# Patient Record
Sex: FEMALE | Race: WHITE | Employment: FULL TIME | URBAN - METROPOLITAN AREA
[De-identification: names, ages, dates, MRNs, and addresses within clinical notes are randomized per-mention and may not be internally consistent; named-entity substitution may affect disease eponyms.]

---

## 2022-02-11 NOTE — ED INITIAL ASSESSMENT (HPI)
Pt states it feels like something is \"stick in the back of my throat\" started 2/11/22 after lunch at 1030

## 2022-12-31 NOTE — DISCHARGE INSTRUCTIONS
Ice to the back. Gentle stretching exercises. Take the medications as prescribed. The muscle relaxant may make you drowsy, no driving while taking. Follow-up with your doctor or the physical medicine doctor referred to. Return for any concerns.

## 2023-05-01 NOTE — ED INITIAL ASSESSMENT (HPI)
Pt c/o cough, sore throat, runny nose, body aches, fatigue x3 days, fever since yesterday, loss of taste today. Tmax 101. Nyquil last dose at 1030am today.

## 2023-05-11 NOTE — PATIENT INSTRUCTIONS
#1 angioedema and urticaria  Chronic and intermittent in nature. Recent flare after COVID infection last month. Currently on Zyrtec 4 times per day with good control. By history response to antihistamines. No prior ED visits or prednisone. No prior EpiPen use. No current EpiPen. Prior allergy evaluation 1 year ago in [facility] for environmental allergies. Handouts on angioedema and urticaria provided and reviewed  Continue with Zyrtec 4 times per day. If refractory to Zyrtec may consider Xyzal in place of Zyrtec 5 mg once a day up to 4 times per day  EpiPen prescription placed. Check TSH  Check C1 esterase inhibitor level  Consider Xolair if refractory to above recommendations continue with Zyrtec 4 times per day until patient has been hive free and swelling free for at least 4 weeks. Then may try decreasing Zyrtec dose by 1 dose every week      #2 environmental allergies  Reviewed avoidance measures and potential treatment option of immunotherapy  Zyrtec as an antihistamine  May add Flonase or Nasacort 2 sprays per nostril once a day if having prominent nasal congestion or postnasal drip    #3 COVID vaccines up-to-date x3 doses.   Recommend booster and indicated    #4 recommend flu vaccine in the fall         Orders This Visit:  Orders Placed This Encounter      Complement C4 and C1 Esterase Serum plus C1 Esterase Inhibitor Function      TSH W Reflex To Free T4      Meds This Visit:  Requested Prescriptions     Signed Prescriptions Disp Refills    EPINEPHrine (EPIPEN 2-EROS) 0.3 MG/0.3ML Injection Solution Auto-injector 1 each 0     Sig: Inject IM in event of  allergic reaction

## 2023-05-16 NOTE — TELEPHONE ENCOUNTER
----- Message from Patrick Isidro MD sent at 5/11/2023  4:07 PM CDT -----  Please contact patient with normal TSH level 0.666

## 2023-05-16 NOTE — TELEPHONE ENCOUNTER
----- Message from Hans Frederick MD sent at 5/16/2023  2:39 PM CDT -----   Please contact patient with normal C1 esterase inhibitor panel

## 2023-05-30 NOTE — TELEPHONE ENCOUNTER
Left message for the third time   Sent [employer] message as well  Letter sent home to contact office in order to go over test results listed below

## 2023-06-05 NOTE — TELEPHONE ENCOUNTER
Patient calling in regards to test results  Pt confirmed name, and . Pt verbalizes understanding, and denies further questions.

## 2023-11-08 NOTE — PROGRESS NOTES
Avery Lopez is a 28year old male. HPI:     Chief Complaint   Patient presents with    Allergies     Having flare up     Patient is a 25-year-old male who presents for follow-up with a chief complaint of hives and swelling  Patient last seen by me in May 2023    Patient with history of chronic urticaria and angioedema. Intermittent flares over the years. Has EpiPen. Prior allergy evaluation 2 years ago in [facility]. Prior lab evaluation with me including C1 esterase inhibitor panel TSH were unremarkable. No prior testing to environmental allergens with me in the past    COVID-vaccine x3 doses per patient report    Medication list include EpiPen Zyrtec Xyzal    Today patient reports    Anna Johnson  July : lasted 2 weeks. Xyzal 2x/day  then back to 4x/day   Active or persistent symptoms  1x in aug: no trigger, lasted 1 week . 1x in October: covid like symptoms, test negative. Lasted 1 week    ED visits or prednisone in the interim:  denies   Has pred and epi at home   Active meds:  xyzal 4 pills per day   A/w with hives in July 2023  Lip and heal swelling as well in July 2023  Oct 2023: lip swelling   No oral swelling or resp issues     No prior xolair       HISTORY:  Past Medical History:   Diagnosis Date    Idiopathic angioedema       No past surgical history on file. No family history on file. Social History:   Social History     Socioeconomic History    Marital status:    Tobacco Use    Smoking status: Never     Passive exposure: Never    Smokeless tobacco: Never   Vaping Use    Vaping Use: Never used   Substance and Sexual Activity    Alcohol use: Yes     Comment: occ    Drug use: Never        Medications (Active prior to today's visit):  Current Outpatient Medications   Medication Sig Dispense Refill    levocetirizine 5 MG Oral Tab Take 1 tablet (5 mg total) by mouth every evening.       predniSONE 10 MG Oral Tab Take 3 tabs(30 mg) with food once a day x 5 days 15 tablet 0 Cholecalciferol (VITAMIN D3) 250 MCG (58202 UT) Oral Cap       EPINEPHrine (EPIPEN 2-EROS) 0.3 MG/0.3ML Injection Solution Auto-injector Inject IM in event of  allergic reaction 1 each 0    B Complex Vitamins (VITAMIN B COMPLEX) Oral Tab       omeprazole 20 MG Oral Capsule Delayed Release TAKE 1 CAPSULE IN THE MORNING 30 MINUTES BEFORE FOOD      cetirizine 10 MG Oral Tab          Allergies:  No Known Allergies      ROS:   Allergic/Immuno:  See hpi  Cardiovascular:  Negative for irregular heartbeat/palpitations, chest pain, edema  Constitutional:  Negative night sweats,weight loss, irritability and lethargy  ENMT:  Negative for ear drainage, hearing loss and nasal drainage  Eyes:  Negative for eye discharge and vision loss  Gastrointestinal:  Negative for abdominal pain, diarrhea and vomiting  Integumentary:  Negative for pruritus and rash  Respiratory:  Negative for cough, dyspnea and wheezing    PHYSICAL EXAM:   Constitutional: responsive, no acute distress noted  Head/Face: NC/Atraumatic  Eyes/Vision: conjunctiva and lids are normal extraocular motion is intact   Ears/Audiometry: tympanic membranes are normal bilaterally hearing is grossly intact  Nose/Mouth/Throat: nose and throat are clear mucous membranes are moist   Neck/Thyroid: neck is supple without adenopathy  Lymphatic: no abnormal cervical, supraclavicular or axillary adenopathy is noted  Respiratory: normal to inspection lungs are clear to auscultation bilaterally normal respiratory effort   Cardiovascular: regular rate and rhythm no murmurs, gallups, or rubs  Abdomen: soft non-tender non-distended  Skin/Hair: no unusual rashes present   Extremities: no edema, cyanosis, or clubbing     ASSESSMENT/PLAN:   Assessment   Encounter Diagnoses   Name Primary?     Urticaria Yes    Angioedema, subsequent encounter     COVID-19 vaccine series completed     Flu vaccine need     Environmental allergies      #1 chronic idiopathic urticaria angioedema  4 flares since July 2023. No ED visits or prednisone. No EpiPen needed. Flare in July August and September and October  Each flare lasted approximately 1 week in spite of Xyzal up to 4 times per day    Recommend trial of Xolair as patient has been refractory to Xyzal up to 4 times per day  Xolair application provided. Reviewed benefits and risk of the medication. Continue with Xyzal up to 4 times per day  If any further swelling prior to Xolair status known then will add prednisone 30 mg once a day with food x5 days  Prior TSH and C1 esterase inhibitor testing was negative    #2 environmental allergies  Xyzal as an antihistamine  Flonase or Nasacort 2 sprays per nostril once a day if having prominent nasal congestion or postnasal drip    #3 COVID vaccines reviewed. 3 doses per patient report. Query sent  Recommend booster this fall    #4 flu vaccine recommended this fall and offered  Flu shot guven            Orders This Visit:  Orders Placed This Encounter   Procedures    Fluzone Quadrivalent 6mo+ 0.5mL       Meds This Visit:  Requested Prescriptions     Signed Prescriptions Disp Refills    predniSONE 10 MG Oral Tab 15 tablet 0     Sig: Take 3 tabs(30 mg) with food once a day x 5 days       Imaging & Referrals:  INFLUENZA VACCINE, QUAD, PRESERVATIVE FREE, 0.5 ML     11/8/2023  Jess Manrique MD    If medication samples were provided today, they were provided solely for patient education and training related to self administration of these medications. Teaching, instruction and sample was provided to the patient by myself. Teaching included  a review of potential adverse side effects as well as potential efficacy. Patient's questions were answered in regards to medication administration and dosing and potential side effects.  Teaching was provided via the teach back method

## 2023-11-08 NOTE — PATIENT INSTRUCTIONS
#1 chronic idiopathic urticaria angioedema  4 flares since July 2023. No ED visits or prednisone. No EpiPen needed. Flare in July August and September and October  Each flare lasted approximately 1 week in spite of Xyzal up to 4 times per day    Recommend trial of Xolair as patient has been refractory to Xyzal up to 4 times per day  Xolair application provided. Reviewed benefits and risk of the medication. Continue with Xyzal up to 4 times per day  If any further swelling prior to Xolair status known then will add prednisone 30 mg once a day with food x5 days  Prior TSH and C1 esterase inhibitor testing was negative    #2 environmental allergies  Xyzal as an antihistamine  Flonase or Nasacort 2 sprays per nostril once a day if having prominent nasal congestion or postnasal drip    #3 COVID vaccines reviewed. 3 doses per patient report.   Query sent  Recommend booster this fall    #4 flu vaccine recommended this fall and offered     predniSONE 10 MG Oral Tab 15 tablet 0     Sig: Take 3 tabs(30 mg) with food once a day x 5 days

## 2023-11-08 NOTE — TELEPHONE ENCOUNTER
Pt in office for Consult. Xolair Enrollment form completed. Will complete office ordering portion and then send to [address] [place] [address].

## 2023-11-14 NOTE — TELEPHONE ENCOUNTER
RN completed clinical portion of Xolair Prescriber Service From    Dr. Aurelio Wright completed physician portion of [address] [place] [address] Prescriber Service Form [employer]. Completed Prescriber Service Form and Patient Consent Form faxed to [employer] at [phone]. Await Fax confirmation.

## 2023-11-21 NOTE — TELEPHONE ENCOUNTER
Xolair PA form received. PA form is asking if patient has been evaluated for other causes of urticaria including bradykinin-related angioedema and interleukin-1 associated urticarial syndromes. Please advise on labs.

## 2023-11-21 NOTE — TELEPHONE ENCOUNTER
[facility] contacted at [phone]. RN spoke with PA representative, Tg Cheema. Requested Xolair PA form be sent to Allergy Office via fax. Await form.

## 2023-11-21 NOTE — TELEPHONE ENCOUNTER
LMTCB on patient's voicemail. Message left on voicemail that nurse needs to speak with him about labs that must be completed in order for insurance to approve coverage of Xolair.

## 2023-11-21 NOTE — TELEPHONE ENCOUNTER
[employer] PA form received via fax.      Covered Specialty Pharmacy: [facility]    Complete PA through Pharmacy Benefit:    [facility]    Telephone [phone]

## 2023-11-27 NOTE — TELEPHONE ENCOUNTER
Message left on patient's voicemail and through [address] as below . . . Manju Mahoney,     Dr. Meseret Hilario has placed an order for two lab tests, Connective Tissue Disease (VIDAL) Screen and Sed Rate. Insurance is requiring that these labs be performed to rule out nonallergic cause for hives. You do not require an appointment to have these labs performed. You may present to any of the [facility] locations in order to have the labs completed. Please call the Allergy Office at [phone] with any questions or send questions via [email or web address]. Shantanu Flowers, RN    This [email or web address] message has not been read.

## 2023-12-04 NOTE — TELEPHONE ENCOUNTER
Pt has not yet completed VIDAL or Sed Rate for Xolair Enrollment per insurance request.    RN left another voicemail for pt requesting that they complete blood work. Fax from [facility] stating that specialty pharmacy is [facility] [phone]    Reports that: \"The pharmacy has closed the order due to no response from provider. At this time, [employer] will place this service request on hold and will discontinue follow-ups. If additional services are requested, please contact us\"      RN advised pt that we cannot move forward with enrollment process until he has completed lab work. Provided call back number if he has any additional questions.

## 2023-12-07 NOTE — TELEPHONE ENCOUNTER
Patient contacted via telephone. Patient given results and information as per Dr. Jc Dewey below. Patient informed that negative lab results will be sent into his insurance with PA form for Xolair. Patient informed once response from insurance is received, nurse will contact him with next steps in order to start Xolair therapy. Patient verbalized understanding of information. Nelli Bourgeois MD  [facility] Clinical Staff  Please contact patient with negative VIDAL screening. This is good news.   This is a screening test for autoimmune disease and thankfully was negative      Saurav Middleton MD  12/5/2023  5:07 PM CST        Please call patient with normal ESR  of 7

## 2023-12-11 NOTE — TELEPHONE ENCOUNTER
Xolair [facility] PA form completed. Dr. Josué Grossman reviewed and signed form. Completed form with VIDAL result, Sed Rate result and 11/8/2023 Allergy Physician Visit Note faxed to [facility] Department at [phone]. Await fax confirmation.

## 2023-12-12 NOTE — TELEPHONE ENCOUNTER
Patient contacted, informed that Prescription for Prednisone was sent to his pharmacy and that patient will need to see Dr. Chance Lee for a f/u appt for any further refills of Prednisone. Patient verbalized understanding of information.

## 2023-12-12 NOTE — TELEPHONE ENCOUNTER
Patient last seen in Allergy 11/8/2023 for . . .     Urticaria Yes    Angioedema, subsequent encounter      COVID-19 vaccine series completed      Flu vaccine need      Environmental allergies         Requested Prescriptions   Pending Prescriptions Disp Refills    predniSONE 10 MG Oral Tab 15 tablet 0     Sig: Take 3 tabs(30 mg) with food once a day x 5 days       There is no refill protocol information for this order        Patient contacted via telephone. Patient offers that 2 weeks prior he did experience Angioedema of face and did take the course of Prednisone that was prescribed 11/8/2023. Patient denies any current angioedema, difficulty breathing or swallowing. He continues to take Xyzal 5 mg up to 4 times a day as needed. He states that he would like a refill of Prednisone in the event he begins to experience angioedema again. Patient informed Dr. Galo Jamil will address 12/13, and nurse will call back with his advice. Patient verbalized understanding of information.

## 2023-12-12 NOTE — TELEPHONE ENCOUNTER
Xolair PA approval Letter received from [facility]. Xolair 150 mg/mL prefilled syringe. Effective Dates: 12/12/2023- 6/12/2024    PA#: [employer] Benefit Plan: [id number]        [facility] contacted at [phone]. RN spoke with pharmacy service repYael December. Rep confirms that Xolair 150 mg/mL prefilled syringe Rx for 300 mg every 28 days was received from [facility]. Dr. Yeison Corbett, [address] pended for phone in. See [employer] message sent to patient . . .    Patient contacted via telephone as well and informed that [employer] message was sent with detailed instructions. Reviewed instructions over the phone. Patient offers he has been contacted by [facility] and will call [facility] with copayment assistance information. Almas Bhardwaj,         I have good news. The prior authorization for Xolair was approved by your insurance. Your covered specialty pharmacy is [facility] (Telephone Number [phone]). A Xolair prescription has been sent to your specialty pharmacy. You should have been contacted by [employer] with Xolair copay assistance information. If you have not received any communication with [employer], please call [phone] and obtain copay assistance information for Xolair. Then, please call the specialty pharmacy and give the copay assistance information and sophia physician's office permission to schedule delivery of Xolair to physician's office. An RN will then schedule delivery of Xolair to physician's office. Once the delivery of Xolair is received, your first Xolair injection visit / follow-up appointment with Dr. Yeison Corbett can be scheduled. Please be aware there is a 2 hour observation period in office post first Xolair injection. Subsequent Xolair injections require a 30 min observation period.          Marainn Matthews., RN

## 2023-12-13 NOTE — TELEPHONE ENCOUNTER
12/12/2023's Xolair Rx went through for a one time dose. Fax received from [facility] reporting above. Correct Rx for Xolair pended.

## 2023-12-19 NOTE — TELEPHONE ENCOUNTER
Please call the patient to schedule a consult with Dr Graciela Marshall. DVT's. Referred by Dr Sharifa Hyde.

## 2023-12-20 NOTE — TELEPHONE ENCOUNTER
Crystal from [facility] calling to schedule delivery for Xolair    Xolair 150 mg/ml syringe x 2 scheduled delivery for 12/27/23    First scheduled injection for 12/21/23

## 2023-12-21 NOTE — TELEPHONE ENCOUNTER
Patient contacted via telephone and informed as below. Patient verbalized understanding of information. Spoke with Dr. Rigoberto Drake regarding the possibility of angioedema, urticaria contributing towards development of DVT patient was diagnosed with 11/2023. Dr. Rigoberto Drake offers that it is unlikely that angioedema or urticaria is the cause of the DVT.

## 2023-12-21 NOTE — TELEPHONE ENCOUNTER
May I please have an in-clinic order for Xolair 150 mg/mL prefilled syringe, 300 mg every 28 days for diagnosis L50.1? Patient diagnosed with DVT to right leg 11/2023. Patient asks if there can be a correlation between the angioedema and development of DVT.

## 2023-12-21 NOTE — TELEPHONE ENCOUNTER
Spoke with Dr. Blanca Cisneros regarding the possibility of angioedema, urticaria contributing towards development of DVT patient was diagnosed with 11/2023. Dr. Blanca Cisneros offers that it is unlikely that angioedema or urticaria is the cause of the DVT.

## 2024-01-16 NOTE — PROGRESS NOTES
Per standing order patient to continue Xolair injections every 28 days 300  mg.     Patient verified name, , and injection to be given.     At 0957 , Xolair 300 mg administered subcutaneously to bilateral upper arms . 150 mg administered to right upper arm. 150 mg administered to left upper arm      Xolair 150 mg/mL prefilled syringe x 2  Lot #: [id number]  Exp Date: OCT 2024      Patient tolerated injection with no adverse side effects noted at time of administration.         ?  Patient monitored x 30 min after administration.   At 1025, patient released from office without any sign/symptoms of local or systemic reaction.  ?  Patient scheduled next Biologic Injection Appointment for 24

## 2024-01-17 NOTE — TELEPHONE ENCOUNTER
Spoke with Robert at [facility] specialty pharmacy regarding delivery of Xolair. Robert states Xolair will be delivered 1/23/24.    Next appointment is scheduled for 2/13/24.

## 2024-01-17 NOTE — TELEPHONE ENCOUNTER
Fred/[facility] [phone] is calling to coordinate delivery for the patient's Xolair medication.

## 2024-01-17 NOTE — TELEPHONE ENCOUNTER
Patient calling to inform that he has been asked by the [facility] speciality pharmacy to have our office contact them to coordinate the delivery of:  omalizumab (Xolair)

## 2024-01-18 NOTE — ED PROVIDER NOTES
Chief Complaint   Patient presents with    Medication Question       HPI:     Milton Andrew is a 33 year old male who presents with a request to identify the cause of past episodes of diarrhea.  Patient states that he had in episode of frequent diarrhea from January 12 to January 16.  Patient states he was having liquid stools several times an hour initially that tapered off to several times a day that has completely resolved at this point.  Patient admits to having nausea at that time but no vomiting.  He thought he may have had a tactile fever at that time.  Patient denies any blood in his stool at that time.  Patient states that he did have mid abdominal cramp like pain during the 4 days of his diarrhea illness.  Patient states in October of last year he had a similar episode of diarrhea that also lasted 4 days and resolved spontaneously.  Patient is concerned that this may happen again and he is requesting information as to what could have caused it and how to prevent it from reoccurring.  At this time patient denies any abdominal pain, denies any loose stools, denies any urinary symptoms, denies any nausea vomiting or other concerns.  Patient does not have a primary care provider and states he has not been seen by a primary care provider for several years.  He does follow with an allergist for condition of angioedema.      PFSH    PFS asessment screens reviewed and agree.  Nurses notes reviewed I agree with documentation.    Family History   Family history unknown: Yes     Family history is reviewed and is as noted in HPI.  Social History     Socioeconomic History    Marital status:      Spouse name: Not on file    Number of children: Not on file    Years of education: Not on file    Highest education level: Not on file   Occupational History    Not on file   Tobacco Use    Smoking status: Never     Passive exposure: Never    Smokeless tobacco: Never   Vaping Use    Vaping Use: Never used    Substance and Sexual Activity    Alcohol use: Yes     Comment: occasional    Drug use: Never    Sexual activity: Yes   Other Topics Concern    Not on file   Social History Narrative    Will is  to Dilan x 2 yrs. He has no children. Patient works [employer]. He and his spouse live in [place], [place].     Social Determinants of Health     Financial Resource Strain: Not on file   Food Insecurity: Not on file   Transportation Needs: Not on file   Physical Activity: Not on file   Stress: Not on file   Social Connections: Not on file   Housing Stability: Not on file         ROS:   Positive for stated complaint: History of diarrhea  All other systems reviewed and negative except as noted above.  Constitutional and Vital Signs Reviewed.      Physical Exam:     Findings:    /75   Pulse 68   Temp 97.9 °F (36.6 °C) (Temporal)   Resp 16   SpO2 100%   GENERAL: well developed, well nourished, obese, well hydrated, no distress  SKIN: good skin turgor, no obvious rashes  NECK: supple, no adenopathy  CARDIO: RRR without murmur, Cap refill brisk  EXTREMITIES: URRUTIA without difficulty  GI: soft, non-tender to palpation, negative Dawson sign, bowel sounds are normal active, no pain with palpation of left or right lower quadrant.  HEAD: normocephalic, atraumatic, no facial asymmetry  EYES: bilateral sclera non icteric, no conjunctival injection or discharge, no periorbital swelling  THROAT: airway patent,   LUNGS: Full symmetric AE, clear to auscultation bilaterally; no rales, rhonchi, or wheezes, No signs of increased WOB  NEURO: No observed focal deficits, speech clear  PSYCH: Alert and oriented x3.  Answering questions appropriately.  Mood appropriate.    MDM/Assessment/Plan:   Orders for this encounter:    No orders of the defined types were placed in this encounter.      Labs performed this visit:  No results found for this or any previous visit (from the past 10 hour(s)).    MDM:  Differential diagnosis  includes history of diarrhea with possible etiology of viral origin, colitis, diverticulitis, food intolerance.  Discussed at length with patient and he agrees to establish care with a primary care provider for complete physical and screening tests.  Further evaluation if the diarrhea should occur would best be handled by his primary care provider.  Patient agrees with this plan of care.    Diagnosis:    ICD-10-CM    1. Well exam without abnormal findings of patient 18 years of age or older  Z00.00           All results reviewed and discussed with patient.  See AVS for detailed discharge instructions for your condition today.    Follow Up with:  Caden Hood MD  [address] [place] [id number]  [phone]    Schedule an appointment as soon as possible for a visit in 1 week

## 2024-01-18 NOTE — DISCHARGE INSTRUCTIONS
As we discussed there are many possible etiologies for the 2 episodes of diarrhea that you have had.  Since all of your symptoms have resolved at this time I recommend you start with establishing care with a primary care provider.  A referral has been given for a primary care provider.  In the event that you develop similar symptoms push oral fluids, avoid high-fiber foods, and seek medical evaluation if diarrhea lasts more than 5 days, if there is blood in the stool, if you develop any dizziness or lightheadedness, or other concerns.Thank you for choosing our [facility] for your medical condition today. Please be sure to follow up with your primary care provider as directed. If any concerns call your primary care provider, return here or go to the emergency department.

## 2024-01-18 NOTE — ED INITIAL ASSESSMENT (HPI)
Patient wants to make sure his medication is  not causing explosive diarrhea.  He had an episode of explosive diarrhea a few months back and a week ago.  He denies any symptoms at this time.

## 2024-01-23 NOTE — TELEPHONE ENCOUNTER
Called [facility] to coordinate delivery of Xolair.    Spoke to Gabbi    Next Injection Appointment Date: 2/13/2023      Confirmed dose and shipping address.      Gabbi reports that it is too soon to refill.  She reports that 2/6 is the earliest date to coordinate delivery.

## 2024-01-25 PROBLEM — E66.812 OBESITY, CLASS II, BMI 35-39.9: Status: ACTIVE | Noted: 2024-01-25

## 2024-01-25 PROBLEM — Z91.09 ENVIRONMENTAL ALLERGIES: Status: ACTIVE | Noted: 2024-01-25

## 2024-01-25 PROBLEM — Z79.899 ON PRE-EXPOSURE PROPHYLAXIS FOR HIV: Status: ACTIVE | Noted: 2024-01-25

## 2024-01-25 PROBLEM — T78.3XXA ANGIO-EDEMA: Status: ACTIVE | Noted: 2024-01-25

## 2024-01-25 PROBLEM — E66.9 OBESITY, CLASS II, BMI 35-39.9: Status: ACTIVE | Noted: 2024-01-25

## 2024-01-25 NOTE — PROGRESS NOTES
Subjective:   Milton Andrew is a 33 year old male who presents for Physical (Patient presents today for annual physical & functional dyspepsia.)     33-year-old male coming in for a routine physical.  Following up with hematology after superficial vein thrombosis.  Hematology considered treatment for 45 days however given that was already on higher dose of Xarelto decided to treat for 3 months.  Patient states he is at day 40 and would like to discontinue therapy.  Currently getting medications such as naltrexone, metformin, topiramate combination through [facility].  Is on PrEP through an online pharmacy  Follows up with allergist Dr. Salgado for chronic idiopathic urticaria angioedema   Attempting to eat a healthier diet and exercising regularly in an effort to lose weight.  Sexually active with 1 male partner () for the past year however interested in STD testing.      History/Other:    Chief Complaint Reviewed and Verified  Nursing Notes Reviewed and   Verified  Tobacco Reviewed  Allergies Reviewed  Medications Reviewed    Medical History Reviewed  Surgical History Reviewed  Family History   Reviewed  Social History Reviewed         Tobacco:  He has never smoked tobacco.    Current Outpatient Medications   Medication Sig Dispense Refill    buPROPion 100 MG Oral Tab Take 3 tablets (300 mg total) by mouth 2 (two) times daily.      metFORMIN HCl ER, OSM, 500 MG (OSM) Oral Tablet 24 Hr Take 1 tablet (500 mg total) by mouth daily with breakfast.      omeprazole 20 MG Oral Capsule Delayed Release Take 1 capsule (20 mg total) by mouth every morning.      NALTREXONE HCL OR       Topiramate (TOPIRAGEN OR)       Cyanocobalamin (B-12) 1000 MCG Oral Cap Take 1 tablet by mouth daily.      TRUVADA 200-300 MG Oral Tab Take 1 tablet by mouth daily.      levocetirizine (XYZAL ALLERGY 24HR) 5 MG Oral Tab Take 1 tablet (5 mg total) by mouth in the morning, at noon, in the evening, and at bedtime.      Omalizumab  (XOLAIR) 150 MG/ML Subcutaneous Solution Prefilled Syringe Inject 300 mg into the skin every 28 days. 2 mL 5    predniSONE 10 MG Oral Tab Take 3 tabs(30 mg) with food once a day x 5 days 15 tablet 0    EPINEPHrine (EPIPEN 2-EROS) 0.3 MG/0.3ML Injection Solution Auto-injector Inject IM in event of  allergic reaction 1 each 0         Review of Systems:  Review of Systems   Constitutional:  Negative for chills, diaphoresis and fever.   HENT:  Negative for congestion, ear discharge, ear pain, sinus pressure, sinus pain and sore throat.    Eyes:  Negative for pain and discharge.   Respiratory:  Negative for cough, chest tightness, shortness of breath and wheezing.    Cardiovascular:  Negative for chest pain and palpitations.   Gastrointestinal:  Negative for abdominal pain, diarrhea, nausea and vomiting.   Endocrine: Negative for cold intolerance and heat intolerance.   Genitourinary:  Negative for dysuria, flank pain, frequency and urgency.   Musculoskeletal:  Negative for joint swelling.   Skin:  Negative for rash.   Neurological:  Negative for dizziness, syncope and headaches.   Psychiatric/Behavioral:  Negative for confusion and hallucinations.        Objective:   /70 (BP Location: Right arm, Patient Position: Sitting, Cuff Size: large)   Pulse 80   Temp 96.8 °F (36 °C) (Temporal)   Ht 5' 10\" (1.778 m)   Wt 248 lb (112.5 kg)   SpO2 97%   BMI 35.58 kg/m²  Estimated body mass index is 35.58 kg/m² as calculated from the following:    Height as of this encounter: 5' 10\" (1.778 m).    Weight as of this encounter: 248 lb (112.5 kg).  Physical Exam  Constitutional:       General: He is not in acute distress.     Appearance: Normal appearance. He is obese. He is not ill-appearing or toxic-appearing.   HENT:      Head: Normocephalic and atraumatic.      Right Ear: Tympanic membrane and ear canal normal.      Left Ear: Tympanic membrane and ear canal normal.      Mouth/Throat:      Mouth: Mucous membranes are moist.       Pharynx: Oropharynx is clear. No oropharyngeal exudate or posterior oropharyngeal erythema.   Eyes:      Extraocular Movements: Extraocular movements intact.      Pupils: Pupils are equal, round, and reactive to light.   Cardiovascular:      Rate and Rhythm: Normal rate and regular rhythm.      Heart sounds: Normal heart sounds. No murmur heard.     No gallop.   Pulmonary:      Effort: Pulmonary effort is normal. No respiratory distress.      Breath sounds: Normal breath sounds. No stridor. No wheezing, rhonchi or rales.   Abdominal:      General: Bowel sounds are normal.      Palpations: Abdomen is soft.      Tenderness: There is no abdominal tenderness. There is no right CVA tenderness, left CVA tenderness or guarding.   Musculoskeletal:         General: No swelling.      Cervical back: Normal range of motion and neck supple. No rigidity or tenderness.      Right lower leg: No edema.      Left lower leg: No edema.   Skin:     General: Skin is warm and dry.   Neurological:      General: No focal deficit present.      Mental Status: He is alert and oriented to person, place, and time. Mental status is at baseline.      Cranial Nerves: No cranial nerve deficit.      Sensory: No sensory deficit.      Motor: Motor function is intact. No weakness.      Gait: Gait normal.   Psychiatric:         Mood and Affect: Mood normal.         Behavior: Behavior normal.         Thought Content: Thought content normal.         Judgment: Judgment normal.         Assessment & Plan:   1. Routine medical exam (Primary)  -Healthy diet and lifestyle.  -Weight loss.  -Exercise as tolerated.  -Dental exam every 6 months or as recommended by dentist.  -Eye exams annually, at least every 2 years or as recommended by specialist.  -     CBC, Platelet; No Differential; Future; Expected date: 01/25/2024  -     Comp Metabolic Panel (14); Future; Expected date: 01/25/2024  -     Hemoglobin A1C; Future; Expected date: 01/25/2024  -     Lipid  Panel; Future; Expected date: 01/25/2024  -     TSH W Reflex To Free T4; Future; Expected date: 01/25/2024  2. Superficial vein thrombosis  -Continue follow-up with hematology.  Advised not to discontinue Xarelto prior to discussion with Dr. Davidson.  3. Environmental allergies  On Xyzal and nasal spray.  Continue follow-up with Dr. Salgado (allergist).  4. Angioedema, subsequent encounter  Chronic idiopathic urticaria angioedema.  On Xyzal and Xolair.  -Continue follow-up with Dr. Salgado  5. Screen for STD (sexually transmitted disease)  Sexually active with 1 male partner () for the past year however interested in STD testing.  -     HIV Ag/Ab Combo; Future; Expected date: 01/25/2024  -     HCV Antibody; Future; Expected date: 01/25/2024  -     T Pallidum Screening Cascade; Future; Expected date: 01/25/2024  -     Chlamydia/Gc Amplification; Future; Expected date: 01/25/2024  6. On pre-exposure prophylaxis for HIV  On Truvada through online pharmacy.  -     HIV Ag/Ab Combo; Future; Expected date: 01/25/2024  7. Obesity, Class II, BMI 35-39.9  -Continue with healthy diet and lifestyle  -Continue with exercise        Return in about 6 months (around 7/25/2024).    Jayy Dial MD, 1/25/2024, 5:22 PM

## 2024-01-30 NOTE — PROGRESS NOTES
Hematology Progress Note    Patient Name: Milton Andrew   YOB: 1990   Medical Record Number: [id number]   CSN: [id number]   Consulting Physician: Bonilla Davidson MD  Referring Physician(s): Caryn Hood MD  Date of Visit: 1/30/2024     Chief Complaint:  Superficial Vein Thrombus    History of Present Illness:     Milton Andrew is a 33 year old male that was seen at the time of consultation in the Hematology suite for evaluation of the above condition.  Patient has PMH relevant for idiopathic angioedema.  He reports symptoms of right lower extremity ankle pain extending up to behind the knee for roughly 4 weeks prior to seeking evaluation via ultrasound on 12/15/23. His ultrasound imaging shows a SVT in the greater saphenous vein from the proximal to distal calf measuring 4.5 cm in length.  Patient has been initiated on anticoagulation by his referring PCP initially at Xarelto 10 mg daily and then increased to treatment dose as if he had a DVT with 15 mg of Xarelto twice daily for the last roughly 3 weeks with plans to go to 20 mg/daily soon. He is tolerating anticoagulation well with improvement in symptoms; denying any bleeding or bruising symptoms.  Denies any provoking causes for DVT such as immobilization, trauma, catheter use, and no identifiable risk factors for SVT.  Patient has no systemic signs of illness. His ROS is negative for systemic signs of illness or other concerns.    Interval History:  Patient returns for follow-up of SVT Xarelto 20 mg daily.  Denies any major bleeding or bruising symptoms. Will reports RLE pain symptoms have resolved.  Denies DVT recurrence or PE symptoms.  He is interested in pursuing martial arts and would like to be off anticoagulation.  No other concerns on ROS.    Past Medical History:  Past Medical History:   Diagnosis Date    Acid reflux     Idiopathic angioedema 05/2023    Superficial vein thrombosis 12/15/2023       Past Surgical  History:  Past Surgical History:   Procedure Laterality Date    WISDOM TEETH REMOVED      no associated bleeding complications       Family Medical History:  Family History   Family history unknown: Yes       Social History:  Social History     Socioeconomic History    Marital status:      Spouse name: Not on file    Number of children: Not on file    Years of education: Not on file    Highest education level: Not on file   Occupational History    Not on file   Tobacco Use    Smoking status: Never     Passive exposure: Never    Smokeless tobacco: Never   Vaping Use    Vaping Use: Never used   Substance and Sexual Activity    Alcohol use: Yes     Comment: occasional    Drug use: Never    Sexual activity: Yes   Other Topics Concern    Not on file   Social History Narrative    Julian is  to Dilan jolly 2 yrs. He has no children. Patient works [employer] care [employer]. He and his spouse live in [place], [place].     Social Determinants of Health     Financial Resource Strain: Not on file   Food Insecurity: Not on file   Transportation Needs: Not on file   Physical Activity: Not on file   Stress: Not on file   Social Connections: Not on file   Housing Stability: Not on file       Allergies:   No Known Allergies    Current Medications:   tadalafil 5 MG Oral Tab Take 1 tablet (5 mg total) by mouth daily as needed.      buPROPion 100 MG Oral Tab Take 3 tablets (300 mg total) by mouth 2 (two) times daily.      metFORMIN HCl ER, OSM, 500 MG (OSM) Oral Tablet 24 Hr Take 1 tablet (500 mg total) by mouth daily with breakfast.      omeprazole 20 MG Oral Capsule Delayed Release Take 1 capsule (20 mg total) by mouth every morning.      NALTREXONE HCL OR       Topiramate (TOPIRAGEN OR)       Cyanocobalamin (B-12) 1000 MCG Oral Cap Take 1 tablet by mouth daily.      TRUVADA 200-300 MG Oral Tab Take 1 tablet by mouth daily.      levocetirizine (XYZAL ALLERGY 24HR) 5 MG Oral Tab Take 1 tablet (5 mg total) by mouth in the morning, at  noon, in the evening, and at bedtime.      Omalizumab (XOLAIR) 150 MG/ML Subcutaneous Solution Prefilled Syringe Inject 300 mg into the skin every 28 days. 2 mL 5    predniSONE 10 MG Oral Tab Take 3 tabs(30 mg) with food once a day x 5 days 15 tablet 0    EPINEPHrine (EPIPEN 2-EROS) 0.3 MG/0.3ML Injection Solution Auto-injector Inject IM in event of  allergic reaction 1 each 0      omalizumab (Xolair) injection 300 mg 2.4 mL  300 mg Subcutaneous Q28 Days Clive Salgado MD   300 mg at 01/16/24 0957       Review of Systems:    Constitutional: Negative for anorexia, chills, fatigue, fevers, night sweats and weight loss.  Eyes: Negative for visual disturbance, irritation and redness.  Respiratory: Negative for cough, hemoptysis, chest pain, or dyspnea on exertion.  Gastrointestinal: Negative for dysphagia, odynophagia, reflux symptoms, nausea, vomiting, change in bowel habits, diarrhea, constipation and abdominal pain.  Integument/breast: Negative for rash, skin lesions, and pruritus.  Hematologic/lymphatic: Negative for easy bruising, bleeding, and lymphadenopathy.  Musculoskeletal: Negative for myalgias, arthralgias, muscle weakness.  Neurological: Negative for headaches, dizziness, speech problems, gait problems and weakness.    A comprehensive 14 point review of systems was completed.  Pertinent positives and negatives noted in the the HPI.     Vital Signs:  /79 (BP Location: Left arm, Patient Position: Sitting, Cuff Size: large)   Pulse 79   Temp 97.7 °F (36.5 °C) (Oral)   Resp 16   Ht 1.778 m (5' 10\")   Wt 110.7 kg (244 lb)   SpO2 99%   BMI 35.01 kg/m²     Physical Examination:    General: Patient is alert and oriented x 3, not in acute distress.  Psych:  Friendly, cooperative with appropriate questions and responses  HEENT: EOMs intact. Oropharynx is clear.   Neck:  No palpable lymphadenopathy. Neck is supple.  Lymphatics: There is no palpable lymphadenopathy throughout in the cervical,  supraclavicular, axillary, or inguinal regions.  Chest: Clear to auscultation. No wheezes or rales.  Heart: Regular rate and rhythm. S1S2 normal.  Abdomen: Soft, non tender with good bowel sounds.  No hepatosplenomegaly.  No palpable mass.  Extremities: No edema or calf tenderness.  Neurological: Grossly intact.      Labs:    Lab Results   Component Value Date/Time    WBC 5.1 01/02/2024 04:03 PM    RBC 5.33 01/02/2024 04:03 PM    HGB 14.8 01/02/2024 04:03 PM    HCT 44.4 01/02/2024 04:03 PM    MCV 83.3 01/02/2024 04:03 PM    MCH 27.8 01/02/2024 04:03 PM    MCHC 33.3 01/02/2024 04:03 PM    RDW 13.3 01/02/2024 04:03 PM    NEPRELIM 3.01 01/02/2024 04:03 PM    .0 01/02/2024 04:03 PM       Lab Results   Component Value Date/Time     (H) 01/02/2024 04:03 PM    BUN 16 01/02/2024 04:03 PM    CREATSERUM 1.40 (H) 01/02/2024 04:03 PM    CA 9.5 01/02/2024 04:03 PM    ALB 4.5 01/02/2024 04:03 PM     01/02/2024 04:03 PM    K 4.0 01/02/2024 04:03 PM     01/02/2024 04:03 PM    CO2 24.0 01/02/2024 04:03 PM    ALKPHO 51 01/02/2024 04:03 PM    AST 27 01/02/2024 04:03 PM    ALT 23 01/02/2024 04:03 PM         Impression:    No diagnosis found.    33 year old M with PMH relevant for idiopathic angioedema presents for an evaluation of a RLE SVT identified on 12/15/23    Plan:    1.) SVT--no risk factors for the development of this clot    --discussed with pt that he would qualify for intermediate risk for SVT and initially would have rec anticoagulation with Xarelto 10 mg x 45 days; however, his referring PCP is already treating him as a DVT with full dose anticoagulation  --tolerating tx well with resolution of right lower extremity swelling    --Initially planned treatment will be for 3 mo; Xarelto 20 mg/daily with food; however, the pt would prefer the 45 days of anticoagulation as he is planning to start martial arts and would like to be off anticoagulation to prevent bleeding complications    --discussed  plans to look for hereditary and acquired thrombophilia as potential causes of his SVT: results show he is negative for factor V Leiden mutation, prothrombin gene mutation, Antithrombin III deficiency, protein C and protein S deficiency  --likely has a false positive for antiphospholipid antibody presence in the setting of Xarelto, so will require repeat testing in 3 months at the time of his f/u appt    --we reviewed [facility] in clinic which shows no difference in outcomes or prevention of recurrence with tx for 45 days vs. 90 days of anticoagulation  --he will go for repeat ultrasound now and if clot has resolved, then he will stop Xarelto   --Will is planning to RTC in 3 mo for repeat antiphospholipid ab testing     2.) Current Use of Anticoagulation    --advised pt to be mindful of use of aspirin and ibuprofen while on Xarelto to decrease his bleeding risks  --no contraindications as patient has never had intracranial bleeding, GI bleeding or falls  --normal CBC+diff at baseline, has normal LFT's and mild in Cr on 1/2/24 but appropriate for Xarelto    MDM: Moderate Risk    Bonilla Davidson MD  [facility]  [facility]  [address] [place] [address], [place] [address]

## 2024-02-01 ENCOUNTER — APPOINTMENT (OUTPATIENT)
Dept: HEMATOLOGY/ONCOLOGY | Facility: HOSPITAL | Age: 34
End: 2024-02-01
Attending: INTERNAL MEDICINE

## 2024-02-06 NOTE — PROGRESS NOTES
Milton Andrew is a 33 year old male.    HPI:   No chief complaint on file.    Patient is a 33-year-old male who presents for follow-up via video visit    This visit is conducted using Telemedicine with live, interactive video and audio during this Coronavirus pandemic.     Please note that the following visit was completed using two-way, real-time interactive audio and/or video communication.  This has been done in good ambrose to provide continuity of care in the best interest of the provider-patient relationship, due to the ongoing public health crisis/national emergency and because of restrictions of visitation.  There are limitations of this visit as no physical exam could be performed.  Every conscious effort was taken to allow for sufficient and adequate time.  This billing was spent on reviewing labs, medications, radiology tests and decision making.  Appropriate medical decision-making and tests are ordered as detailed in the plan of care below     Patient last seen by me in November 2023 for chronic spontaneous urticaria and angioedema  Prior C1 esterase inhibitor testing was normal normal TSH  COVID-vaccine x 3 doses  Flu vaccine up-to-date    Medication list include Xolair 300 mg every 4 weeks Xyzal EpiPen and proton pump number    Today patient reports    Chronic spontaneous urticaria/idiopathic  Active or persistent symptoms: denies   Xolair 2-3 doses so far   ED visits or prednisone in the interim denies  Active meds: Xolair 300 mg every 4 weeks, Xyzal once a day up to 4 times per day if needed  Better since starting xolair,   Xyzal 4x/day   Last flare was Nov 2023, none since starting xolair     Xolair in office     Has epipen utd     Denies adverse events or side effects with Xolair today    HISTORY:  Past Medical History:   Diagnosis Date    Acid reflux     Idiopathic angioedema 05/2023    Superficial vein thrombosis 12/15/2023      Past Surgical History:   Procedure Laterality Date     WISDOM TEETH REMOVED      no associated bleeding complications      Family History   Family history unknown: Yes      Social History:   Social History     Socioeconomic History    Marital status:    Tobacco Use    Smoking status: Never     Passive exposure: Never    Smokeless tobacco: Never   Vaping Use    Vaping Use: Never used   Substance and Sexual Activity    Alcohol use: Yes     Comment: occasional    Drug use: Never    Sexual activity: Yes   Social History Narrative    Julian is  to Dilan x 2 yrs. He has no children. Patient works health care analytics. He and his spouse live in [place], [place].        Medications (Active prior to today's visit):  Current Outpatient Medications   Medication Sig Dispense Refill    tadalafil 5 MG Oral Tab Take 1 tablet (5 mg total) by mouth daily as needed.      buPROPion 100 MG Oral Tab Take 3 tablets (300 mg total) by mouth 2 (two) times daily.      metFORMIN HCl ER, OSM, 500 MG (OSM) Oral Tablet 24 Hr Take 1 tablet (500 mg total) by mouth daily with breakfast.      omeprazole 20 MG Oral Capsule Delayed Release Take 1 capsule (20 mg total) by mouth every morning.      NALTREXONE HCL OR       Topiramate (TOPIRAGEN OR)       Cyanocobalamin (B-12) 1000 MCG Oral Cap Take 1 tablet by mouth daily.      TRUVADA 200-300 MG Oral Tab Take 1 tablet by mouth daily.      levocetirizine (XYZAL ALLERGY 24HR) 5 MG Oral Tab Take 1 tablet (5 mg total) by mouth in the morning, at noon, in the evening, and at bedtime.      Omalizumab (XOLAIR) 150 MG/ML Subcutaneous Solution Prefilled Syringe Inject 300 mg into the skin every 28 days. 2 mL 5    predniSONE 10 MG Oral Tab Take 3 tabs(30 mg) with food once a day x 5 days 15 tablet 0    EPINEPHrine (EPIPEN 2-EROS) 0.3 MG/0.3ML Injection Solution Auto-injector Inject IM in event of  allergic reaction 1 each 0       Allergies:  No Known Allergies      ROS:   Allergic/Immuno:  See hpi  Cardiovascular:  Negative for irregular  heartbeat/palpitations, chest pain, edema  Constitutional:  Negative night sweats,weight loss, irritability and lethargy  ENMT:  Negative for ear drainage, hearing loss and nasal drainage  Eyes:  Negative for eye discharge and vision loss  Gastrointestinal:  Negative for abdominal pain, diarrhea and vomiting  Integumentary:  Negative for pruritus and rash  Respiratory:  Negative for cough, dyspnea and wheezing    PHYSICAL EXAM:   Constitutional: responsive, no acute distress noted  Head/Face: NC/Atraumatic  Speaking in full sentences no increased work of breathing  A&O x 3     ASSESSMENT/PLAN:   Assessment   Encounter Diagnoses   Name Primary?    Chronic idiopathic urticaria Yes    Angioedema, subsequent encounter     Visit for monitoring Xolair therapy     Flu vaccine need     COVID-19 vaccine series completed        #1 chronic idiopathic urticaria and angioedema  No hives or swelling since starting Xolair  Patient has 2-3 doses so far without side effects.  Currently using Xyzal 4 times per day  May try decreasing Xyzal by 1 dose every week as long as clinically tolerating.  Would still remain on Xyzal 5 mg at least once a day  Patient can be posted of any issues or flares when trying to titrate down the Xyzal dosing.  Continue with Xolair 300 mg every 4 weeks.  Reviewed potential home administration in the future once patient is comfortable and has had a minimum of 3 doses in office.  Patient does have an EpiPen in the home setting.    #2 visit for monitoring Xolair therapy.  Denies side effects to date  Overall improving with Xolair    #3 flu vaccine recommended and offered  Flu vaccine up-to-date from November 2023      #4 COVID-vaccine recommend booster.  New booster available since September 2023.  Reviewed I do not have the vaccine in my office    Follow-up with me in 6 months or sooner if needed     Orders This Visit:  No orders of the defined types were placed in this encounter.      Meds This  Visit:  Requested Prescriptions      No prescriptions requested or ordered in this encounter       Imaging & Referrals:  None     2/6/2024  Clive Salgado MD    If medication samples were provided today, they were provided solely for patient education and training related to self administration of these medications.  Teaching, instruction and sample was provided to the patient by myself.  Teaching included  a review of potential adverse side effects as well as potential efficacy.  Patient's questions were answered in regards to medication administration and dosing and potential side effects. Teaching was provided via the teach back method

## 2024-02-06 NOTE — PATIENT INSTRUCTIONS
#1 chronic idiopathic urticaria and angioedema  No hives or swelling since starting Xolair  Patient has 2-3 doses so far without side effects.  Currently using Xyzal 4 times per day  May try decreasing Xyzal by 1 dose every week as long as clinically tolerating.  Would still remain on Xyzal 5 mg at least once a day  Patient can be posted of any issues or flares when trying to titrate down the Xyzal dosing.  Continue with Xolair 300 mg every 4 weeks.  Reviewed potential home administration in the future once patient is comfortable and has had a minimum of 3 doses in office.  Patient does have an EpiPen in the home setting.    #2 visit for monitoring Xolair therapy.  Denies side effects to date  Overall improving with Xolair    #3 flu vaccine recommended and offered  Flu vaccine up-to-date from November 2023      #4 COVID-vaccine recommend booster.  New booster available since September 2023.  Reviewed I do not have the vaccine in my office

## 2024-02-13 NOTE — H&P
Per standing order patient to continue Xolair injections every 28 days- 300 mg.     Patient verified name, , and injection to be given.         Patient confirms that he/she does have an Epi-Pen to use in case of an anaphylactic reaction due to Xolair.       Printed Official from [facility], Xolair Administration Instructions reviewed with patient.       Patient watched form [facility], Xolair Administration Video.       Patient  demonstrated how to give Xolair injection with Demo .      Patient demonstrated Subcutaneous Xolair Injection well.      Patient repeated back instructions for Xolair administration.      - Keep Xolair refrigerated, remove from refrigerator 60 min prior to administration. Leave at room temp until ready to inject.     -Inspect Xolair syringe (right med, right dose), contents of Xolair clear with no particles. Make sure Xolair syringe not .      - Wash hands, cleanse injection site with alcohol wipe.     -Remove cap, inject Xolair to upper thighs, abdomen 2 inches away from umbilicus at 45 to 90 degree angle with bevel up.      - Rotate sites monthly     -Do not rub injection site, place band-aid on if needed.     -Place syringe in sharp container.     Patient agreed to administer Epi-Pen if hives, shortness of breath, tightness in chest, edema of face/airway occur and to present to Emergency Department immediately after administration of Epi-Pen.     Patient agreed to report any adverse reaction or allergic reaction symptoms to Allergy RNs or Dr. Salgado if they should occur.     Patient verbalized comfort in administrating Epi-Pen if needed.                Patient verbalizes that they feel comfortable injecting Xolair at home.         Xolair 150 mg/mL prefilled syringe x2  NDC #: 69893-603-82  Lot #s: [id number]  Exp Date: 2024    RN injected Xolair 150 mg/mL prefilled syringe subcutaneously to Left Upper Outer Leg. Patient self-injected Xolair 150 mg/mL prefilled syringe  subcutaneously to Right Upper Outer Leg     Patient tolerated injection with no adverse side effects noted at time of administration.         ?  Patient monitored x 30 min after administration.   At 1102, patient released from office without any sign/symptoms of local or systemic reaction.    Pre PF: (3 readings) for asthma diagnosis  Post PF: (3 readings)  ?  . .

## 2024-02-13 NOTE — TELEPHONE ENCOUNTER
Patient presenting today for 3rd Xolair injection appointment.     Please advise if patient may be given Xolair injection education for at home use.

## 2024-02-13 NOTE — TELEPHONE ENCOUNTER
[facility] contacted at [phone].  RN spoke with pharmacy repCathi.       Attestation given for patient to order medication for delivery to home.

## 2024-02-14 NOTE — TELEPHONE ENCOUNTER
Medication delivered to our office. Patient was trained yesterday to inject at home.   Attestation was given to Specialty pharmacy yesterday to allow them to ship medication to his home.    Xolair 150 mg PFS x 2 received and placed in refrigerator.     RN left message for patient to call back. We did not coordinate delivery of medication to our office. Unsure if he was expecting his medication to be delivered to his home address today?  Will await patient call back.

## 2024-02-15 NOTE — TELEPHONE ENCOUNTER
Patient contacted.  He was informed that nurse attempted to contact him on 2/14/2024 to be sure he understands that he will need to schedule Xolair to his home, that Allergy office will not be scheduling delivery.     Patient verbalized understanding of information.

## 2024-03-25 ENCOUNTER — LAB ENCOUNTER (OUTPATIENT)
Dept: LAB | Facility: HOSPITAL | Age: 34
End: 2024-03-25
Attending: INTERNAL MEDICINE
Payer: COMMERCIAL

## 2024-03-25 DIAGNOSIS — I82.890 SUPERFICIAL VEIN THROMBOSIS: ICD-10-CM

## 2024-03-25 DIAGNOSIS — Z79.01 CURRENT USE OF ANTICOAGULANT THERAPY: ICD-10-CM

## 2024-03-25 DIAGNOSIS — E05.90 SUBCLINICAL HYPERTHYROIDISM: ICD-10-CM

## 2024-03-25 LAB
T4 FREE SERPL-MCNC: 1 NG/DL (ref 0.8–1.7)
TSI SER-ACNC: 0.63 MIU/ML (ref 0.55–4.78)

## 2024-03-25 PROCEDURE — 85613 RUSSELL VIPER VENOM DILUTED: CPT

## 2024-03-25 PROCEDURE — 84443 ASSAY THYROID STIM HORMONE: CPT

## 2024-03-25 PROCEDURE — 85598 HEXAGNAL PHOSPH PLTLT NEUTRL: CPT

## 2024-03-25 PROCEDURE — 86147 CARDIOLIPIN ANTIBODY EA IG: CPT

## 2024-03-25 PROCEDURE — 85610 PROTHROMBIN TIME: CPT

## 2024-03-25 PROCEDURE — 36415 COLL VENOUS BLD VENIPUNCTURE: CPT

## 2024-03-25 PROCEDURE — 84439 ASSAY OF FREE THYROXINE: CPT

## 2024-03-25 PROCEDURE — 85730 THROMBOPLASTIN TIME PARTIAL: CPT

## 2024-03-25 PROCEDURE — 85390 FIBRINOLYSINS SCREEN I&R: CPT

## 2024-03-25 PROCEDURE — 86146 BETA-2 GLYCOPROTEIN ANTIBODY: CPT

## 2024-03-26 LAB
APTT PPP: 26.3 SECONDS (ref 23.3–35.6)
B2 GLYCOPROT1 IGG SERPL IA-ACNC: 1.2 U/ML (ref ?–7)
B2 GLYCOPROT1 IGM SERPL IA-ACNC: <2.4 U/ML (ref ?–7)
CARDIOLIPIN IGG SERPL-ACNC: 4.6 GPL (ref ?–10)
CARDIOLIPIN IGM SERPL-ACNC: 2.1 MPL (ref ?–10)
CONFIRM APTT STACLOT: NEGATIVE
CONFIRM DRVVT: 1 S (ref 0–1.1)
PROTHROMBIN TIME: 14.7 SECONDS (ref 11.6–14.8)

## 2024-04-02 ENCOUNTER — OFFICE VISIT (OUTPATIENT)
Dept: HEMATOLOGY/ONCOLOGY | Facility: HOSPITAL | Age: 34
End: 2024-04-02
Attending: INTERNAL MEDICINE
Payer: COMMERCIAL

## 2024-04-02 VITALS
WEIGHT: 233 LBS | DIASTOLIC BLOOD PRESSURE: 69 MMHG | HEIGHT: 70 IN | SYSTOLIC BLOOD PRESSURE: 128 MMHG | TEMPERATURE: 98 F | RESPIRATION RATE: 16 BRPM | OXYGEN SATURATION: 100 % | BODY MASS INDEX: 33.36 KG/M2 | HEART RATE: 75 BPM

## 2024-04-02 DIAGNOSIS — I82.890 SUPERFICIAL VEIN THROMBOSIS: Primary | ICD-10-CM

## 2024-04-02 PROCEDURE — 99213 OFFICE O/P EST LOW 20 MIN: CPT | Performed by: INTERNAL MEDICINE

## 2024-04-02 RX ORDER — ESTRADIOL VALERATE 40 MG/ML
40 INJECTION INTRAMUSCULAR WEEKLY
COMMUNITY
Start: 2024-03-03

## 2024-04-02 RX ORDER — SPIRONOLACTONE 50 MG/1
50 TABLET, FILM COATED ORAL DAILY
COMMUNITY
Start: 2024-03-25

## 2024-04-02 NOTE — PROGRESS NOTES
Hematology Progress Note    Patient Name: Milton Andrew   YOB: 1990   Medical Record Number: [id number]   CSN: [id number]   Consulting Physician: Bonilla Davidson MD  Referring Physician(s): Caryn Hood MD  Date of Visit: 4/2/2024     Chief Complaint:  Superficial Vein Thrombus    History of Present Illness:     Milton Andrew is a 33 year old male that was seen at the time of consultation in the Hematology suite for evaluation of the above condition.  Patient has PMH relevant for idiopathic angioedema.  He reports symptoms of right lower extremity ankle pain extending up to behind the knee for roughly 4 weeks prior to seeking evaluation via ultrasound on 12/15/23. His ultrasound imaging shows a SVT in the greater saphenous vein from the proximal to distal calf measuring 4.5 cm in length.  Patient has been initiated on anticoagulation by his referring PCP initially at Xarelto 10 mg daily and then increased to treatment dose as if he had a DVT with 15 mg of Xarelto twice daily for the last roughly 3 weeks with plans to go to 20 mg/daily soon. He is tolerating anticoagulation well with improvement in symptoms; denying any bleeding or bruising symptoms.  Denies any provoking causes for DVT such as immobilization, trauma, catheter use, and no identifiable risk factors for SVT.  Patient has no systemic signs of illness. His ROS is negative for systemic signs of illness or other concerns.    Interval History:  Patient returns for follow-up of SVT s/p completion of 3 mo of planned Xarelto 20 mg daily.  Denies any major bleeding or bruising symptoms. Phonenix reports RLE pain symptoms have resolved.  Denies DVT recurrence or PE symptoms. They have no systemic signs of illness. Phoenix is currently being helped with chronic hives by allergy immunology. Phoenix is now on estrogen and spironolactone in an effort to transition into becoming a woman. No other concerns on ROS.    Past Medical History:  Past  Medical History:   Diagnosis Date    Acid reflux     Idiopathic angioedema 05/2023    Superficial vein thrombosis 12/15/2023       Past Surgical History:  Past Surgical History:   Procedure Laterality Date    WISDOM TEETH REMOVED      no associated bleeding complications       Family Medical History:  Family History   Family history unknown: Yes       Social History:  Social History     Socioeconomic History    Marital status:      Spouse name: Not on file    Number of children: Not on file    Years of education: Not on file    Highest education level: Not on file   Occupational History    Not on file   Tobacco Use    Smoking status: Never     Passive exposure: Never    Smokeless tobacco: Never   Vaping Use    Vaping Use: Never used   Substance and Sexual Activity    Alcohol use: Yes     Comment: occasional    Drug use: Never    Sexual activity: Yes   Other Topics Concern    Not on file   Social History Narrative    Will is  to Dilan jolly 2 yrs. He has no children. Patient works health care analytics. 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     Social Determinants of Health     Financial Resource Strain: Not on file   Food Insecurity: Not on file   Transportation Needs: Not on file   Physical Activity: Not on file   Stress: Not on file   Social Connections: Not on file   Housing Stability: Not on file       Allergies:   No Known Allergies    Current Medications:   Estradiol Valerate 40 MG/ML Intramuscular Oil Inject 1 mL (40 mg total) into the muscle once a week.      spironolactone 50 MG Oral Tab Take 1 tablet (50 mg total) by mouth daily.      tadalafil 5 MG Oral Tab Take 1 tablet (5 mg total) by mouth daily as needed.      buPROPion 100 MG Oral Tab Take 3 tablets (300 mg total) by mouth 2 (two) times daily.      metFORMIN HCl ER, OSM, 500 MG (OSM) Oral Tablet 24 Hr Take 1 tablet (500 mg total) by mouth daily with breakfast.      omeprazole 20 MG Oral Capsule Delayed Release Take 1 capsule (20 mg  total) by mouth every morning.      NALTREXONE HCL OR       Topiramate (TOPIRAGEN OR)       Cyanocobalamin (B-12) 1000 MCG Oral Cap Take 1 tablet by mouth daily.      TRUVADA 200-300 MG Oral Tab Take 1 tablet by mouth daily.      Omalizumab (XOLAIR) 150 MG/ML Subcutaneous Solution Prefilled Syringe Inject 300 mg into the skin every 28 days. 2 mL 5      omalizumab (Xolair) injection 300 mg 2.4 mL  300 mg Subcutaneous Q28 Days Clive Salgado MD   300 mg at 02/13/24 1023       Review of Systems:    Constitutional: Negative for anorexia, chills, fatigue, fevers, night sweats and weight loss.  Eyes: Negative for visual disturbance, irritation and redness.  Respiratory: Negative for cough, hemoptysis, chest pain, or dyspnea on exertion.  Gastrointestinal: Negative for dysphagia, odynophagia, reflux symptoms, nausea, vomiting, change in bowel habits, diarrhea, constipation and abdominal pain.  Integument/breast: Negative for rash, skin lesions, and pruritus.  Hematologic/lymphatic: Negative for easy bruising, bleeding, and lymphadenopathy.  Musculoskeletal: Negative for myalgias, arthralgias, muscle weakness.  Neurological: Negative for headaches, dizziness, speech problems, gait problems and weakness.    A comprehensive 14 point review of systems was completed.  Pertinent positives and negatives noted in the the HPI.     Vital Signs:  /69 (BP Location: Left arm, Patient Position: Sitting, Cuff Size: large)   Pulse 75   Temp 98.3 °F (36.8 °C) (Oral)   Resp 16   Ht 1.778 m (5' 10\")   Wt 105.7 kg (233 lb)   SpO2 100%   BMI 33.43 kg/m²     Physical Examination:    General: Patient is alert and oriented x 3, not in acute distress.  Psych:  Friendly, cooperative with appropriate questions and responses  HEENT: EOMs intact. Oropharynx is clear.   Neck:  No palpable lymphadenopathy. Neck is supple.  Lymphatics: There is no palpable lymphadenopathy throughout in the cervical, supraclavicular, axillary, or inguinal  regions.  Chest: Clear to auscultation. No wheezes or rales.  Heart: Regular rate and rhythm. S1S2 normal.  Abdomen: Soft, non tender with good bowel sounds.  No hepatosplenomegaly.  No palpable mass.  Extremities: No edema or calf tenderness.  Neurological: Grossly intact.      Labs:    Lab Results   Component Value Date/Time    WBC 5.2 01/30/2024 02:27 PM    RBC 4.95 01/30/2024 02:27 PM    HGB 14.1 01/30/2024 02:27 PM    HCT 41.4 01/30/2024 02:27 PM    MCV 83.6 01/30/2024 02:27 PM    MCH 28.5 01/30/2024 02:27 PM    MCHC 34.1 01/30/2024 02:27 PM    RDW 13.5 01/30/2024 02:27 PM    NEPRELIM 3.01 01/02/2024 04:03 PM    .0 01/30/2024 02:27 PM       Lab Results   Component Value Date/Time    GLU 78 01/30/2024 02:27 PM    BUN 25 (H) 01/30/2024 02:27 PM    CREATSERUM 1.38 (H) 01/30/2024 02:27 PM    CA 9.1 01/30/2024 02:27 PM    ALB 4.3 01/30/2024 02:27 PM     01/30/2024 02:27 PM    K 3.7 01/30/2024 02:27 PM     01/30/2024 02:27 PM    CO2 24.0 01/30/2024 02:27 PM    ALKPHO 55 01/30/2024 02:27 PM    AST 27 01/30/2024 02:27 PM    ALT 18 01/30/2024 02:27 PM         Impression:    No diagnosis found.    33 year old M with PMH relevant for idiopathic angioedema presents for an evaluation of a RLE SVT identified on 12/15/23    Plan:    1.) SVT--no risk factors for the development of this clot    --discussed with pt that he would qualify for intermediate risk for SVT and initially would have rec anticoagulation with Xarelto 10 mg x 45 days; however, his referring PCP is already treating him as a DVT with full dose anticoagulation of Xarelto 20 mg/daily at the time of consultation  --tolerated tx well with resolution of right lower extremity swelling    --Initially planned treatment will be for 3 mo; Xarelto 20 mg/daily with food; however, the pt would wanted to do only 45 days of anticoagulation as he was planning to start martial arts and would like to be off anticoagulation to prevent bleeding  complications    --Phoenix then underwent a repeat LE ultrasound in 1/2024 showing thrombosis was still present, so continued on full dose Xarelto 20mg/daily for 3 mo prior to discontinuation on 3/15.   --No longer with RLE pain, swelling, redness symptoms or features of post phlebitis syndrome, so does not require repeat ultrasound imaging with resolution of symptoms    --discussed plans to look for hereditary and acquired thrombophilia as potential causes of his SVT: results show Phoenix is negative for factor V Leiden mutation, prothrombin gene mutation, Antithrombin III deficiency, protein C and protein S deficiency  --likely hada false positive for antiphospholipid antibody presence in the setting of Xarelto, as repeat testing in 3 months after stopping Xarelto in March, 2024 did NOT show the presence of the antiphospholipid ab    --[facility] which shows no difference in outcomes or prevention of recurrence with tx for 45 days vs. 90 days of anticoagulation    --Phoenix was informed of signs or symptoms of DVT or PE should be evaluated in the ER emergently  --Phoenix presents recommended to return to clinic if another DVT or PE develops to discuss possible long-term anticoagulation plans  --Phoenix does not require routine follow-up in hematology at this time but is welcome to return to my clinic in the future as needed  --Felipe was informed of potential risk of estrogen based therapy to cause a DVT or PE as the patient is currently taking estrogen and spironolactone in an effort to transition into becoming a woman    2.) Current Use of Anticoagulation    --now off of Xarelto    MDM: Low Risk    Bonilla Davidson MD  [facility]  [facility] [address] [address]

## 2024-05-09 RX ORDER — OMALIZUMAB 150 MG/ML
300 INJECTION, SOLUTION SUBCUTANEOUS
Qty: 2 EACH | Refills: 2 | Status: SHIPPED | OUTPATIENT
Start: 2024-05-09

## 2024-05-09 NOTE — TELEPHONE ENCOUNTER
Patient last seen in Allergy 2/6/2024 for . . .    Chronic idiopathic urticaria  Angioedema, subsequent encounter  Visit for monitoring Xolair therapy      Requested Prescriptions   Pending Prescriptions Disp Refills    XOLAIR 150 MG/ML Subcutaneous Solution Prefilled Syringe [Pharmacy Med Name: XOLAIR SD PFS 150MG/ML]  5     Sig: INJECT 2 SYRINGES UNDER THE SKIN EVERY 4 WEEKS       Biologic Medications Passed - 5/9/2024  3:19 AM        Passed - Appt in past 6 mos or next 3 mos     Recent Outpatient Visits              1 month ago Superficial vein thrombosis    [facility] Hematology Oncology Bonilla Davidson MD    Office Visit    2 months ago Chronic idiopathic urticaria    [facility]    Nurse Only    3 months ago Chronic idiopathic urticaria    [facility] Clive Salgado MD    Telemedicine    3 months ago Superficial vein thrombosis    [facility] Hematology Oncology Bonilla Davidson MD    Office Visit    3 months ago [facility]    [facility]          Future Appointments         Provider Department Appt Notes    In 2 months Clive Salgado MD [facility] Allergy f/u visit, [facility]                       Omalizumab (XOLAIR) 150 MG/ML Subcutaneous Solution Prefilled Syringe 2 each 2 5/9/2024 --    Sig - Route: INJECT 2 SYRINGES UNDER THE SKIN EVERY 4 WEEKS - Subcutaneous    Sent to pharmacy as: Xolair 150 MG/ML Subcutaneous Solution Prefilled Syringe (Omalizumab)    E-Prescribing Status: Receipt confirmed by pharmacy (5/9/2024  7:58 AM CDT)    No prior authorization was found for this prescription.    Found prior authorization for another prescription for the same medication: Approved      Pharmacy    [facility] - [place], [place] - [facility] [address] [phone],  [phone]     Prescription as above sent to pharmacy per protocol.

## 2024-05-17 ENCOUNTER — TELEPHONE (OUTPATIENT)
Dept: ALLERGY | Facility: CLINIC | Age: 34
End: 2024-05-17

## 2024-05-17 NOTE — TELEPHONE ENCOUNTER
Xolair Prior Authorization Form received vis fax from [employer].     Prior Authorization form completed.     Await Dr. Salgado to review and sign form.

## 2024-05-20 NOTE — TELEPHONE ENCOUNTER
Faxed PA forms with progress notes to [facility] pharmacy at [phone].    Awaiting fax confirmation.    Received successful transmission.

## 2024-05-31 NOTE — TELEPHONE ENCOUNTER
[facility] [employer] Prior Authorization Department contacted at [phone].     RN spoke with PA representative, Dulce.    Xolair 150 mg/mL prefilled syringe:    Effective Dates:    5/20/2024-5/20/2025    PA#: [id number]    Rep states that Approval Letter will be faxed to physician's office.

## 2024-07-09 ENCOUNTER — OFFICE VISIT (OUTPATIENT)
Dept: ALLERGY | Facility: CLINIC | Age: 34
End: 2024-07-09

## 2024-07-09 VITALS — SYSTOLIC BLOOD PRESSURE: 131 MMHG | DIASTOLIC BLOOD PRESSURE: 79 MMHG | HEART RATE: 53 BPM | OXYGEN SATURATION: 97 %

## 2024-07-09 DIAGNOSIS — Z23 FLU VACCINE NEED: ICD-10-CM

## 2024-07-09 DIAGNOSIS — Z79.899 VISIT FOR MONITORING XOLAIR THERAPY: ICD-10-CM

## 2024-07-09 DIAGNOSIS — J30.89 SEASONAL AND PERENNIAL ALLERGIC RHINOCONJUNCTIVITIS: ICD-10-CM

## 2024-07-09 DIAGNOSIS — J30.2 SEASONAL AND PERENNIAL ALLERGIC RHINOCONJUNCTIVITIS: ICD-10-CM

## 2024-07-09 DIAGNOSIS — Z23 NEED FOR COVID-19 VACCINE: ICD-10-CM

## 2024-07-09 DIAGNOSIS — T78.3XXD ANGIOEDEMA, SUBSEQUENT ENCOUNTER: ICD-10-CM

## 2024-07-09 DIAGNOSIS — L50.1 CHRONIC IDIOPATHIC URTICARIA: Primary | ICD-10-CM

## 2024-07-09 DIAGNOSIS — Z51.81 VISIT FOR MONITORING XOLAIR THERAPY: ICD-10-CM

## 2024-07-09 DIAGNOSIS — H10.10 SEASONAL AND PERENNIAL ALLERGIC RHINOCONJUNCTIVITIS: ICD-10-CM

## 2024-07-09 PROCEDURE — 99214 OFFICE O/P EST MOD 30 MIN: CPT | Performed by: ALLERGY & IMMUNOLOGY

## 2024-07-09 RX ORDER — ESTRADIOL 2 MG/1
TABLET ORAL
COMMUNITY
Start: 2024-06-20

## 2024-07-09 NOTE — PROGRESS NOTES
Milton Andrew is a 33 year old adult.    HPI:     Chief Complaint   Patient presents with    Xolair     Follow-up for Xolair. No major concerns or symptoms. No swelling or hive outbreaks.      Patient is a 33-year-old female who presents for follow-up  Patient last seen by me in February 2024 for chronic idiopathic urticaria and angioedema      Prior C1 esterase inhibitor testing was negative.  Patient currently on Xolair 300 mg every 4 weeks for treatment of underlying CIU  Antihistamines also include Zyrtec or Xyzal    Immunizations reviewed.  COVID-vaccine x 3 doses last in December 2021  Flu vaccine up-to-date from November 2023    Today patient reports    CIU  and angioedema   Active or persistent symptoms: denies   ED visits or prednisone in the interim: denies   Active meds: Xolair,  Xyzal 1x /day   Overall  much improved with xolair  300mg every 4 weeks   Xolair started 1/2024   Xolair at home   100% better   No hives in interim     Ar:  Active or persistent symptoms: some int rn sz   Active meds: xyzal. Flonase   pets :  2 dogs    Nonsmoker         HISTORY:  Past Medical History:    Acid reflux    Idiopathic angioedema    Superficial vein thrombosis      Past Surgical History:   Procedure Laterality Date    [place] teeth removed      no associated bleeding complications      Family History   Family history unknown: Yes      Social History:   Social History     Socioeconomic History    Marital status:    Tobacco Use    Smoking status: Never     Passive exposure: Never    Smokeless tobacco: Never   Vaping Use    Vaping status: Never Used   Substance and Sexual Activity    Alcohol use: Yes     Comment: occasional    Drug use: Never    Sexual activity: Yes   Social History Narrative    Julian is  to Dilan x 2 yrs. He has no children. Patient works health care analytics. He and his spouse live in [place], [place].        Medications (Active prior to today's visit):  Current Outpatient Medications    Medication Sig Dispense Refill    estradiol 2 MG Oral Tab       Omalizumab (XOLAIR) 150 MG/ML Subcutaneous Solution Prefilled Syringe INJECT 2 SYRINGES UNDER THE SKIN EVERY 4 WEEKS 2 each 2    spironolactone 50 MG Oral Tab Take 1 tablet (50 mg total) by mouth daily.      buPROPion 100 MG Oral Tab Take 3 tablets (300 mg total) by mouth 2 (two) times daily.      metFORMIN HCl ER, OSM, 500 MG (OSM) Oral Tablet 24 Hr Take 1 tablet (500 mg total) by mouth daily with breakfast.      omeprazole 20 MG Oral Capsule Delayed Release Take 1 capsule (20 mg total) by mouth every morning.      NALTREXONE HCL OR       Topiramate (TOPIRAGEN OR)       Cyanocobalamin (B-12) 1000 MCG Oral Cap Take 1 tablet by mouth daily.      Estradiol Valerate 40 MG/ML Intramuscular Oil Inject 1 mL (40 mg total) into the muscle once a week. (Patient not taking: Reported on 7/9/2024)      EPINEPHrine (EPIPEN 2-EROS) 0.3 MG/0.3ML Injection Solution Auto-injector Inject IM in event of  allergic reaction (Patient not taking: Reported on 2/13/2024) 1 each 0    rivaroxaban (XARELTO) 20 MG Oral Tab  (Patient not taking: Reported on 4/2/2024)      tadalafil 5 MG Oral Tab Take 1 tablet (5 mg total) by mouth daily as needed. (Patient not taking: Reported on 7/9/2024)      TRUVADA 200-300 MG Oral Tab Take 1 tablet by mouth daily. (Patient not taking: Reported on 7/9/2024)      predniSONE 10 MG Oral Tab Take 3 tabs(30 mg) with food once a day x 5 days (Patient not taking: Reported on 2/13/2024) 15 tablet 0       Allergies:  No Known Allergies      ROS:   Allergic/Immuno:  See hpi  Cardiovascular:  Negative for irregular heartbeat/palpitations, chest pain, edema  Constitutional:  Negative night sweats,weight loss, irritability and lethargy  ENMT:  Negative for ear drainage, hearing loss and nasal drainage  Eyes:  Negative for eye discharge and vision loss  Gastrointestinal:  Negative for abdominal pain, diarrhea and vomiting  Integumentary:  Negative for  pruritus and rash  Respiratory:  Negative for cough, dyspnea and wheezing    PHYSICAL EXAM:   Constitutional: responsive, no acute distress noted  Head/Face: NC/Atraumatic  Eyes/Vision: conjunctiva and lids are normal extraocular motion is intact   Ears/Audiometry: tympanic membranes are normal bilaterally hearing is grossly intact  Nose/Mouth/Throat: nose and throat are clear mucous membranes are moist   Neck/Thyroid: neck is supple without adenopathy  Lymphatic: no abnormal cervical, supraclavicular or axillary adenopathy is noted  Respiratory: normal to inspection lungs are clear to auscultation bilaterally normal respiratory effort   Cardiovascular: regular rate and rhythm no murmurs, gallups, or rubs  Abdomen: soft non-tender non-distended  Skin/Hair: no unusual rashes present   Extremities: no edema, cyanosis, or clubbing     ASSESSMENT/PLAN:   Assessment   Encounter Diagnoses   Name Primary?    Chronic idiopathic urticaria Yes    Angioedema, subsequent encounter     Visit for monitoring Xolair therapy     Flu vaccine need     Need for COVID-19 vaccine      #1 chronic idiopathic urticaria and angioedema  Doing great with Xolair 300 mg every 4 weeks and Xyzal once a day  No ED visits or prednisone.  No hives since starting Xolair back in January 2024.  Continue with current regimen.  Currently on home administration for Xolair.  EpiPen up-to-date  Follow-up in 6 months.      #2 visit for monitoring Xolair therapy  Denies adverse reactions in the interim.  To Xolair.    #3 flu vaccine recommended in the fall    4.  COVID vaccines reviewed.  Recommend booster.  Most recent booster available since September 2023.  Reviewed I do not have the booster in my office.  Please check with local pharmacy    #5 allergic rhinitis  Continue with Xyzal and Flonase  Reviewed avoidance measures and potential treatment option immunotherapy.           Orders This Visit:  No orders of the defined types were placed in this  encounter.      Meds This Visit:  Requested Prescriptions      No prescriptions requested or ordered in this encounter       Imaging & Referrals:  None     7/9/2024  Clive Salgado MD    If medication samples were provided today, they were provided solely for patient education and training related to self administration of these medications.  Teaching, instruction and sample was provided to the patient by myself.  Teaching included  a review of potential adverse side effects as well as potential efficacy.  Patient's questions were answered in regards to medication administration and dosing and potential side effects. Teaching was provided via the teach back method

## 2024-07-09 NOTE — PATIENT INSTRUCTIONS
#1 chronic idiopathic urticaria and angioedema  Doing great with Xolair 300 mg every 4 weeks and Xyzal once a day  No ED visits or prednisone.  No hives since starting Xolair back in January 2024.  Continue with current regimen.  Currently on home administration for Xolair.  EpiPen up-to-date  Follow-up in 6 months.      #2 visit for monitoring Xolair therapy  Denies adverse reactions in the interim.  To Xolair.    #3 flu vaccine recommended in the fall    4.  COVID vaccines reviewed.  Recommend booster.  Most recent booster available since September 2023.  Reviewed I do not have the booster in my office.  Please check with local pharmacy    #5 allergic rhinitis  Continue with Xyzal and Flonase  Reviewed avoidance measures and potential treatment option immunotherapy.

## 2024-08-05 RX ORDER — OMALIZUMAB 150 MG/ML
300 INJECTION, SOLUTION SUBCUTANEOUS
Qty: 2 ML | Refills: 2 | Status: SHIPPED | OUTPATIENT
Start: 2024-08-05

## 2024-08-05 NOTE — TELEPHONE ENCOUNTER
Patient last seen in Allergy 7/9/2024 for . . .    Chronic idiopathic urticaria Yes     Angioedema, subsequent encounter      Visit for monitoring Xolair therapy      Flu vaccine need      Need for COVID-19 vaccine      Requested Prescriptions   Pending Prescriptions Disp Refills    XOLAIR 150 MG/ML Subcutaneous Solution Prefilled Syringe [Pharmacy Med Name: XOLAIR SD PFS 150MG/ML]  2     Sig: INJECT 2 SYRINGES UNDER THE SKIN EVERY 4 WEEKS       Biologic Medications Passed - 8/5/2024  1:35 PM        Passed - Appt in past 6 mos or next 3 mos     Recent Outpatient Visits              3 weeks ago Chronic idiopathic urticaria    [facility] Clive Salgado MD    Office Visit    4 months ago Superficial vein thrombosis    [facility] Bonilla Davidson MD    Office Visit    5 months ago Chronic idiopathic urticaria    [facility]    Nurse Only    6 months ago Chronic idiopathic urticaria    [facility] Clive Salgado MD    Telemedicine    6 months ago Superficial vein thrombosis    [facility] Oncology Bonilla Davidson MD    Office Visit          Future Appointments         Provider Department Appt Notes    In 5 months Clive Salgado MD [facility] 6 mon f/u                       Omalizumab (XOLAIR) 150 MG/ML Subcutaneous Solution Prefilled Syringe 2 mL 2 8/5/2024 --    Sig - Route: INJECT 2 SYRINGES UNDER THE SKIN EVERY 4 WEEKS - Subcutaneous    Sent to pharmacy as: Xolair 150 MG/ML Subcutaneous Solution Prefilled Syringe (Omalizumab)    E-Prescribing Status: Receipt confirmed by pharmacy (8/5/2024  2:57 PM CDT)    No prior authorization was found for this prescription.    Found prior authorization for another prescription for the same medication: Approved      Pharmacy    [facility] -  [place], [place] - [address] [phone], [phone]     Prescription as above sent to pharmacy per protocol.

## 2024-10-15 RX ORDER — OMALIZUMAB 150 MG/ML
300 INJECTION, SOLUTION SUBCUTANEOUS
Qty: 2 EACH | Refills: 2 | Status: SHIPPED | OUTPATIENT
Start: 2024-10-15

## 2024-10-15 NOTE — TELEPHONE ENCOUNTER
Patient last seen in Allergy 7/9/2024 for . . .    Chronic idiopathic urticaria Yes     Angioedema, subsequent encounter      Visit for monitoring Xolair therapy      Flu vaccine need      Need for COVID-19 vaccine      Requested Prescriptions   Pending Prescriptions Disp Refills    XOLAIR 150 MG/ML Subcutaneous Solution Prefilled Syringe [Pharmacy Med Name: XOLAIR SD PFS 150MG/ML]  2     Sig: INJECT 2 SYRINGES UNDER THE SKIN EVERY 4 WEEKS       Biologic Medications Passed - 10/15/2024  8:53 AM        Passed - Appt in past 6 mos or next 3 mos     Recent Outpatient Visits              3 months ago Chronic idiopathic urticaria    [facility]    Office Visit    6 months ago Superficial vein thrombosis    [facility] Oncology Bonilla Davidson MD    Office Visit    8 months ago Chronic idiopathic urticaria    [facility]    Nurse Only    8 months ago Chronic idiopathic urticaria    [facility] Clive Salgado MD    Telemedicine    8 months ago Superficial vein thrombosis    [facility] Oncology Bonilla Davidson MD    Office Visit          Future Appointments         Provider Department Appt Notes    In 2 months Clive Salgado MD [facility] 6 mon f/u                       Omalizumab (XOLAIR) 150 MG/ML Subcutaneous Solution Prefilled Syringe 2 each 2 10/15/2024 --    Sig - Route: INJECT 2 SYRINGES UNDER THE SKIN EVERY 4 WEEKS - Subcutaneous    Sent to pharmacy as: Xolair 150 MG/ML Subcutaneous Solution Prefilled Syringe (Omalizumab)    E-Prescribing Status: Receipt confirmed by pharmacy (10/15/2024  8:54 AM CDT)    No prior authorization was found for this prescription.    Found prior authorization for another prescription for the same medication: Approved      Pharmacy    [facility] - [place], [place] - [address] [phone], [phone]     Prescription as above sent to pharmacy per protocol.

## 2024-12-02 ENCOUNTER — PATIENT MESSAGE (OUTPATIENT)
Dept: ALLERGY | Facility: CLINIC | Age: 34
End: 2024-12-02

## 2024-12-03 ENCOUNTER — PATIENT MESSAGE (OUTPATIENT)
Dept: ALLERGY | Facility: CLINIC | Age: 34
End: 2024-12-03

## 2024-12-04 NOTE — TELEPHONE ENCOUNTER
See Zelgor message sent to patient as below . . .    Bismarklo [place],      After 1/1/2025, a new X[employer]air [employer] Enrollment Form and Consent will need to be completed reflecting your new insurance information.      You may complete the forms when you present for the Allergy Follow-Up Appointment scheduled for 1/9/2025, or you may complete the forms at home and send a file of the completed form via Zelgor.         [email or web address]     [email or web address]-Consent_[email or web address]     [employer] will complete a benefit investigation and register you for the Xolair Copayment Assistance Card.  With this savings card, the cost of Xolair is usually $5 - $10 monthly.         Francheska Irizarry RN

## 2025-01-21 RX ORDER — OMALIZUMAB 150 MG/ML
300 INJECTION, SOLUTION SUBCUTANEOUS
Qty: 2 EACH | Refills: 2 | Status: SHIPPED | OUTPATIENT
Start: 2025-01-21

## 2025-01-21 NOTE — TELEPHONE ENCOUNTER
Patient last seen in Allergy 7/9/2024 for . . .    Chronic idiopathic urticaria Yes     Angioedema, subsequent encounter      Visit for monitoring Xolair therapy      Flu vaccine need      Need for COVID-19 vaccine      Requested Prescriptions   Pending Prescriptions Disp Refills    XOLAIR 150 MG/ML Subcutaneous Solution Prefilled Syringe [Pharmacy Med Name: XOLAIR SD PFS 150MG/ML]  2     Sig: INJECT 2 SYRINGES UNDER THE SKIN EVERY 4 WEEKS       Biologic Medications Passed - 1/21/2025  8:03 AM        Passed - Appt in past 6 mos or next 3 mos     Recent Outpatient Visits              6 months ago Chronic idiopathic urticaria    [facility]    Office Visit    9 months ago Superficial vein thrombosis    [facility] Oncology Bonilla Davidson MD    Office Visit    11 months ago Chronic idiopathic urticaria    [facility]    Nurse Only    11 months ago Chronic idiopathic urticaria    [facility] Clive Salgado MD    Telemedicine    11 months ago Superficial vein thrombosis    [facility] Oncology Bonilla Davidson MD    Office Visit          Future Appointments         Provider Department Appt Notes    In 2 months Clive Salgado MD [facility] 6 mon f/u                    Passed - Medication is active on med list            Disp Refills Start End    Omalizumab (XOLAIR) 150 MG/ML Subcutaneous Solution Prefilled Syringe 2 each 2 1/21/2025 --    Sig - Route: INJECT 2 SYRINGES UNDER THE SKIN EVERY 4 WEEKS - Subcutaneous    Sent to pharmacy as: Xolair 150 MG/ML Subcutaneous Solution Prefilled Syringe (Omalizumab)    E-Prescribing Status: Receipt confirmed by pharmacy (1/21/2025  8:06 AM CST)    No prior authorization was found for this prescription.    Found prior authorization for another  prescription for the same medication: Approved      Pharmacy    [facility] - [place], [place] - [address] [phone], [phone]     Prescription as above sent to pharmacy per protocol.

## 2025-01-23 ENCOUNTER — TELEPHONE (OUTPATIENT)
Dept: ALLERGY | Facility: CLINIC | Age: 35
End: 2025-01-23

## 2025-01-23 NOTE — TELEPHONE ENCOUNTER
Fax received from [facility] asking that Dr. Salgado confirm patient's full name as prescription for Xolair went through [place] Leodin.

## 2025-01-27 NOTE — TELEPHONE ENCOUNTER
Form signed by Dr. Salgado.  Complete form faxed to [facility] at [phone].  Fax confirmation received.

## 2025-02-13 ENCOUNTER — TELEPHONE (OUTPATIENT)
Dept: ALLERGY | Facility: CLINIC | Age: 35
End: 2025-02-13

## 2025-02-13 NOTE — TELEPHONE ENCOUNTER
Patient contacted via telephone.     Patient reminded that [employer] Forms will need to be completed due to new insurance.     Patient asked that information be sent to him via [facility].     You  [place] Gian LeVernon now (11:34 AM)     HP  Hello [place],        After 1/1/2025, a new [employer] Enrollment Form and Consent will need to be completed reflecting your new insurance information.     You may complete the forms when you present for the Allergy Follow-Up Appointment scheduled for 1/9/2025, or you may complete the forms at home and send a file of the completed form via [facility].       [email or web address]     [email or web address]-Consent_[email or web address]     [employer] will complete a benefit investigation and register you for the Xolair Copayment Assistance Card.  With this savings card, the cost of Xolair is usually $5 - $10 monthly.       Regards,     Francheska PADILLA RN     This [facility] message has not been read.

## 2025-02-18 NOTE — TELEPHONE ENCOUNTER
You  [place] DeWayne LeodinhJust now (8:37 AM)     HP  Hello [place],     Dr. Salgado was not able to complete the prescription and signature section of the [employer]'s form until 2/17/2025.  The form has been completed and was faxed to [employer], yesterday, 2/17/2025.      Usually I have to wait for [employer] to complete the benefit investigation before I am able to complete the prior authorization for Xolair.      If you are able to send through [employer] a copy or image of the front and back of your new insurance card, I can ascertain your pharmacy benefit manager and contact the pharmacy benefit manager to initiate a prior authorization for Xolair.      RegardsFrancheska RN    This [employer] message has not been read.     [place] DeWayne Leodinh P [facility] Clinical Staff (supporting You)18 hours ago (2:23 PM)     JORJE Pritchard,     I wanted to make sure the requested pre authorization is underway as I was due to take my last dose 4 days ago. Please let me know.     Warm Regards, [place]

## 2025-02-18 NOTE — TELEPHONE ENCOUNTER
Prescriber Service Form clinical sections completed by RN.  Dr. Salgado completed prescription section, reviewed and signed form on 2/17/2025.     [employer]' Prescriber Service Form and Patient Consent faxed on 2/17/2025 to [employer] at [phone].     Fax confirmation received.     Await response from [employer].

## 2025-02-24 NOTE — TELEPHONE ENCOUNTER
Fax received from [employer] reporting that Signature on Xolair Patient Consent Form is not written and cannot be excepted.     See Homeforswap message sent to patient as below.      Hello [place],     [employer] sent a fax to the physician's office asking that you complete the [employer] Patient Consent with a pen signature, not an electronic signature.      Below is the web address of the Patient Consent.  Please complete, sign signature line with a pen and send an image of the form back through Home[employer].         [email or web address]-Consent_[email or web address]     Francheska Irizarry RN

## 2025-02-28 NOTE — TELEPHONE ENCOUNTER
[facility] Xolair prior authorization form received.     To complete prior authorization for Xolair, insurance asks for an Urticaria Activity Score.     See WedPics (deja mi) message sent to patient as below . . .    Hello [place],     I did receive a benefit summary from [employer].      Your covered specialty pharmacy will remain [facility].      The Xolair prior authorization form has been received.  In order to complete the form a Urticarial Activity Score is required.      Below is the web address to access the Urticarial Activity Score Quiz.     Please complete the form and send the results back via WedPics (deja mi).       Once the completed Urticarial Activity Score Quiz results are received, the prior authorization for Xolair may be submitted to insurance.      [email or web address]     Francheska Irizarry RN

## 2025-02-28 NOTE — TELEPHONE ENCOUNTER
Benefit Summary regarding Xolair received via fax from [employer].  New Consent form will not need to be sent to [employer].       Pharmacy Benefit:    Call [facility] for Prior Authorization request.  [phone] (telephone)    Covered Specialty Pharmacy:    [facility].     [facility] contacted at prior authorization telephone number above.  RN spoke with prior authorization representative, Calista.     Representative asked to send Xolair prior authorization form via fax.     Await prior authorization form.

## 2025-03-01 ENCOUNTER — MED REC SCAN ONLY (OUTPATIENT)
Dept: ALLERGY | Facility: CLINIC | Age: 35
End: 2025-03-01

## 2025-03-01 ENCOUNTER — TELEPHONE (OUTPATIENT)
Dept: ALLERGY | Facility: CLINIC | Age: 35
End: 2025-03-01

## 2025-03-01 NOTE — TELEPHONE ENCOUNTER
Received fax from [facility] specialty   Clarification on Xolair dose and directions   RN filled out fax and placed on Dr. Salgado's desk for review and signature     Patient receives Xolair 150 mg/1 mL x2 syringe   Injects 2 syringes under the skin every 4 weeks

## 2025-03-01 NOTE — TELEPHONE ENCOUNTER
Faxed forms to [facility] speciality pharmacy with successful fax confirmation  Forms sent to scan  No further action required at this time

## 2025-03-05 NOTE — TELEPHONE ENCOUNTER
Patient contacted via telephone.  Patient asked if he had seen the HelpHub message sent to him on 2/28 regarding need for completion of Urticaria Score as information is needed to send in with [employer] Prior Authorization Request.     Patient states that he did complete the test and sent back through HelpHub. Uriticaria Score test results were not received via HelpHub.     HelpHub message as below sent 2/28 does not show a receipt of being read.     Patient offers that he will complete Urticaria Score test and send back results via HelpHub.

## 2025-03-06 NOTE — TELEPHONE ENCOUNTER
Patient has not administered Xolair in the last 3 months.  Medication not covered after the 1/1/2025 due to change in insurance coverage. [facility] Medication Dispense history does not show that a prescription for Xolair has been dispensed since 9/7/2024.     Urticarial Score reported by patient at a 5. Patient reporting Moderate 20-50 wheals/24 hours and Intense (severe pruritus, interferes with normal daily activity or sleep).    Patient is taking Xyzal 5 mg up to 4 times a day  or Zyrtec 10 mg up to 4 times a day without improvement in symptoms above.     [employer] Xolair Prior Authorization form completed.

## 2025-03-06 NOTE — TELEPHONE ENCOUNTER
Completed Prior Authorization Form, Urticarial Score Result, 2/13/2025 Allergy Telephone Encounter, 11/8/2023, 2/6/2024, 7/9/2024 Physician Visit Notes faxed to [facility] Federal Employee Program prior authorization department at [phone].     Await fax confirmation.

## 2025-03-10 RX ORDER — OMALIZUMAB 150 MG/ML
300 INJECTION, SOLUTION SUBCUTANEOUS
Qty: 2 EACH | Refills: 2 | Status: SHIPPED | OUTPATIENT
Start: 2025-03-10

## 2025-03-10 NOTE — TELEPHONE ENCOUNTER
Issac Pritchard,     I wanted to confirm that no further action was needed from me to move this along. [facility] has told me by phone that they are waiting on prior authorization.     As of this afternoon (3/6/25), I started having my first flare-up since starting Xolair. Specifically, I started to have swelling in the ball of my right foot. As previously directed, I have immediately (a) increased my Xyzal antihistamine dose from 1 to 4 5mg tablets once a day and (b) started the 5 day course of prednisone (3 tablets spaced throughout the day).     I will need a new predisone prescription to replace the one I'm currently using.     Thanks,     Phoenix Leodinh    TRIAGE)    Assessment)    Patient contacted via telephone.  Patient informed that prior authorization for Xolair has been sent to insurance and per [facility] is currently under review.     Patient denies any current hives.      He offers hives began to appear 3/6/2025 to the right ventral foot.     Patient denies any current hives, edema of face/lips/tongue/throat.     Patient is able to speak in complete sentences over the telephone. Denies difficulty swallowing.    He offers that he began taking on 3/7/2025 Xyzal 20 mg at once and began a prescription for Prednisone prescribed 2023, 30 mg once a day for 5 days.    He offers that hives have resolved currently.     Medications)    Prednisone 30 mg daily is , but patient offers hives are resolving now that he is taking the course.    Xyzal 20 mg daily at once..  Patient asked to take the Xyzal 5 mg 4 times a day spread out throughout the day.     Plan)    Dr. Salgado, patient has been off of Xolair for 2 months due to a change in insurance.  Prior Authorization is pending.     May patient present to office for sample Xolair injection?    Patient informed Dr. Salgado will review triage advice and nurse will call back with his advice.

## 2025-03-10 NOTE — TELEPHONE ENCOUNTER
Patient last seen in Allergy 7/9/2024 for . . .    Chronic idiopathic urticaria Yes     Angioedema, subsequent encounter      Visit for monitoring Xolair therapy      Flu vaccine need      Need for COVID-19 vaccine      Requested Prescriptions   Pending Prescriptions Disp Refills    Omalizumab (XOLAIR) 150 MG/ML Subcutaneous Solution Prefilled Syringe 2 each 2     Sig: Inject 300 mg into the skin every 28 days.       Biologic Medications Passed - 3/10/2025  5:34 PM        Passed - Appt in past 6 mos or next 3 mos     Recent Outpatient Visits              8 months ago Chronic idiopathic urticaria    [facility] Clive Salgado MD    Office Visit    11 months ago Superficial vein thrombosis    [facility] Bonilla Davidson MD    Office Visit    1 year ago Chronic idiopathic urticaria    [facility]    Nurse Only    1 year ago Chronic idiopathic urticaria    [facility] Clive Salgado MD    Telemedicine    1 year ago Superficial vein thrombosis    [facility] Bonilla Davidson MD    Office Visit          Future Appointments         Provider Department Appt Notes    Tomorrow EC ALLERGY [facility] Xolair Injection (please use a sample)    In 2 weeks Jimenez Ochoa MD [facility] Vasectomy consult (as part of gender affirming care)    In 3 weeks Clive Salgado MD [facility] 6 mon f/u                    Passed - Medication is active on med list           Omalizumab (XOLAIR) 150 MG/ML Subcutaneous Solution Prefilled Syringe 2 each 2 3/10/2025 --    Sig - Route: Inject 300 mg into the skin every 28 days. - Subcutaneous    Sent to pharmacy as: Xolair 150 MG/ML Subcutaneous Solution Prefilled Syringe  (Omalizumab)    E-Prescribing Status: Receipt confirmed by pharmacy (3/10/2025  5:34 PM CDT)    No prior authorization was found for this prescription.    Found prior authorization for another prescription for the same medication: Approved      Pharmacy    [facility]/PHARMACY #[id number] [place] [id number] [place] [phone], [phone]     Prescription as above sent to pharmacy per protocol.

## 2025-03-10 NOTE — TELEPHONE ENCOUNTER
See 3/10/2025 Allergy Telephone Encounter for  Refill.  Xolair prescription sent to [facility].

## 2025-03-10 NOTE — TELEPHONE ENCOUNTER
Patient contacted.  Xolair injection appointment scheduled for 3/11/2025 at 1010 am.      Okay for patient to be given a Xolair sample per Dr. Salgado.

## 2025-03-10 NOTE — TELEPHONE ENCOUNTER
[employer] prior authorization department contacted at [phone].     RN spoke with prior authorization representative, Rebecca.     Inquired of representative status of Xolair prior authorization status. Representative offers that patient's request for Xolair prior authorization is still in review.

## 2025-03-10 NOTE — TELEPHONE ENCOUNTER
[employer] fax received reporting Xolair prior authorization approval.     Effective Date:    3/8/2025- 3/10/2026      No prior authorization case number listed.    no

## 2025-03-10 NOTE — TELEPHONE ENCOUNTER
Fax received from [facility] [employer] Program regarding Xolair PA approval.    Xolair authorization is approved through 02/08/2025 to 3/10/2026.

## 2025-03-11 ENCOUNTER — TELEPHONE (OUTPATIENT)
Dept: ALLERGY | Facility: CLINIC | Age: 35
End: 2025-03-11

## 2025-03-11 ENCOUNTER — NURSE ONLY (OUTPATIENT)
Dept: ALLERGY | Facility: CLINIC | Age: 35
End: 2025-03-11
Payer: COMMERCIAL

## 2025-03-11 VITALS
OXYGEN SATURATION: 99 % | RESPIRATION RATE: 20 BRPM | SYSTOLIC BLOOD PRESSURE: 112 MMHG | TEMPERATURE: 98 F | HEART RATE: 72 BPM | DIASTOLIC BLOOD PRESSURE: 71 MMHG

## 2025-03-11 DIAGNOSIS — L50.1 CHRONIC IDIOPATHIC URTICARIA: Primary | ICD-10-CM

## 2025-03-11 PROCEDURE — 3078F DIAST BP <80 MM HG: CPT | Performed by: ALLERGY & IMMUNOLOGY

## 2025-03-11 PROCEDURE — 3074F SYST BP LT 130 MM HG: CPT | Performed by: ALLERGY & IMMUNOLOGY

## 2025-03-11 PROCEDURE — 96372 THER/PROPH/DIAG INJ SC/IM: CPT | Performed by: ALLERGY & IMMUNOLOGY

## 2025-03-11 RX ORDER — TADALAFIL 10 MG/1
10 TABLET ORAL
COMMUNITY
Start: 2025-03-03

## 2025-03-11 RX ORDER — EPINEPHRINE 0.3 MG/.3ML
INJECTION SUBCUTANEOUS
Qty: 1 EACH | Refills: 0 | Status: SHIPPED | OUTPATIENT
Start: 2025-03-11

## 2025-03-11 NOTE — TELEPHONE ENCOUNTER
Patient in office for Xolair injection  Patient requested refill of Epi-Pen, stating that current Epi-Pen on hand has .     Patient denies needing to administer Epi-Pen for an anaphylactic reaction.      EPINEPHrine (EPIPEN 2-EROS) 0.3 MG/0.3ML Injection Solution Auto-injector 1 each 0 3/11/2025 --    Sig: Inject IM in event of  allergic reaction    Sent to pharmacy as: EPINEPHrine 0.3 MG/0.3ML Injection Solution Auto-injector (EpiPen 2-Eros)    E-Prescribing Status: Receipt confirmed by pharmacy (3/11/2025 10:54 AM CDT)      Pharmacy    [facility]/PHARMACY #[id number] - [address] [phone], [phone]     Additional Information    Prescription as above sent to pharmacy per protocol.         Patient last seen in Allergy for physician visit 2024 for . . .    Chronic idiopathic urticaria Yes     Angioedema, subsequent encounter      Visit for monitoring Xolair therapy      Flu vaccine need      Need for COVID-19 vaccine      Requested Prescriptions   Pending Prescriptions Disp Refills    EPINEPHrine (EPIPEN 2-EROS) 0.3 MG/0.3ML Injection Solution Auto-injector 1 each 0     Sig: Inject IM in event of  allergic reaction       EpiPens Failed - 3/11/2025 10:52 AM        Failed - Pass - Pending Nurse Triage Call        Passed - Appt in past 12 mos or next 1 mos     Recent Outpatient Visits              Today     [facility] [place]    Nurse Only    8 months ago Chronic idiopathic urticaria    [facility]    Office Visit    11 months ago Superficial vein thrombosis    [facility] Bonilla Davidson MD    Office Visit    1 year ago Chronic idiopathic urticaria    [facility] [place]    Nurse Only    1 year ago Chronic idiopathic urticaria    [facility]    Telemedicine           Future Appointments         Provider Department Appt Notes    In 2 weeks Jimenez Ochoa MD [facility] Vasectomy consult (as part of gender affirming care)    In 3 weeks Clive Salgado MD [facility] 6 mon f/u                    Passed - Medication is active on med list

## 2025-03-11 NOTE — TELEPHONE ENCOUNTER
May I please have an in-clinic order for Xolair 300 mg/2 mL prefilled syringe for diagnosis of L50.1?

## 2025-03-11 NOTE — TELEPHONE ENCOUNTER
Patient presented to Allergy Department for Xolair injection (sample used).    Patient informed that prior authorization for Xolair has been approved.     Covered specialty pharmacy is [facility].     Patient offers that he did receive new [employer] copayment assistance. information.     Patient asked to call [facility] to report copayment assistance information.      He verbalized understanding of information and repeated back.

## 2025-03-18 ENCOUNTER — TELEPHONE (OUTPATIENT)
Dept: ALLERGY | Facility: CLINIC | Age: 35
End: 2025-03-18

## 2025-03-18 NOTE — TELEPHONE ENCOUNTER
Fax received asking if patient is a new start for Xolair or renewal.     Fax form completed reporting that patient is a continuation of Xolair and last Xolair injection was given 3/11/2025.     Form faxed to [employer] [place] at [phone].     Fax confirmation received.

## 2025-04-02 NOTE — PROGRESS NOTES
Phoenix Leodinh is a 34 year old adult.    HPI:     Chief Complaint   Patient presents with    Xolair     Pt has been well controlled on Xolair. There was a gap in treatment  due to patient assistance program, there was a flare. Right foot, ball of foot started to swell. Immediately started prednisone for two days but that was effective in stopping the flare. Went back to xyzal four times a day.      Patient is a 34-year-old who presents for follow-up with a chief complaint of allergies and hives  Patient last seen by me in July 2024  History of chronic idiopathic urticaria angioedema and allergic rhinitis  Prior C1 esterase inhibitor testing was negative  The med list include EpiPen and Xolair  Patient receiving Xolair 300 mg every 4 weeks in our office.    Immunizations reviewed  COVID-vaccine x 3 doses last in 2021  Last flu vaccine from 2023      Today patient reports    History of Present Illness  Phoenix Leodinh is a 34 year old with chronic idiopathic urticaria and angioedema who presents for follow-up.    Chronic idiopathic urticaria and angioedema have been ongoing issues for them, managed with Xolair. A recent flare-up occurred approximately four weeks ago due to a delay in accessing Xolair, resulting in swelling on the ball of the foot. This was managed with a short course of prednisone and increased Xyzal, resolving the symptoms. They are now back on their regular Xolair dosing schedule every four weeks. No emergency room visits or additional prednisone use have been necessary since the last visit, except for the recent flare-up.    They have no current symptoms of allergic rhinitis, such as itchy, runny, or sneezy symptoms. Environmental allergies have improved, possibly due to Xolair. No recent rashes have been experienced, with rashes only occurring when their condition is completely uncontrolled.    Current medications include spironolactone 50 mg daily, estradiol 2 mg, omeprazole every morning, and  lisdexamfetamine 40 mg once a day for ADHD. They also have an EpiPen, which has not been needed.         HISTORY:  Past Medical History:    Acid reflux    Idiopathic angioedema    Superficial vein thrombosis      Past Surgical History:   Procedure Laterality Date    [place] teeth removed      no associated bleeding complications      Family History   Family history unknown: Yes      Social History:   Social History     Socioeconomic History    Marital status:    Tobacco Use    Smoking status: Never     Passive exposure: Never    Smokeless tobacco: Never   Vaping Use    Vaping status: Never Used   Substance and Sexual Activity    Alcohol use: Yes     Comment: occasional    Drug use: Never    Sexual activity: Yes   Social History Narrative    Julian is  to Dilan jolly 2 yrs. He has no children. Patient works health care analytics. He and his spouse live in [place], [place].        Medications (Active prior to today's visit):  Current Outpatient Medications   Medication Sig Dispense Refill    Tadalafil 10 MG Oral Tab Take 1 tablet (10 mg total) by mouth daily as needed.      EPINEPHrine (EPIPEN 2-EROS) 0.3 MG/0.3ML Injection Solution Auto-injector Inject IM in event of  allergic reaction 1 each 0    Omalizumab (XOLAIR) 150 MG/ML Subcutaneous Solution Prefilled Syringe Inject 300 mg into the skin every 28 days. 2 each 2    estradiol 2 MG Oral Tab       MINOXIDIL EX Apply topically.      Estradiol Valerate 40 MG/ML Intramuscular Oil Inject 1 mL (40 mg total) into the muscle once a week. (Patient not taking: Reported on 7/9/2024)      spironolactone 50 MG Oral Tab Take 1 tablet (50 mg total) by mouth daily.      rivaroxaban (XARELTO) 20 MG Oral Tab  (Patient not taking: Reported on 4/2/2024)      tadalafil 5 MG Oral Tab Take 1 tablet (5 mg total) by mouth daily as needed. (Patient not taking: Reported on 7/9/2024)      buPROPion 100 MG Oral Tab Take 3 tablets (300 mg total) by mouth 2 (two) times daily.      metFORMIN  HCl ER, OSM, 500 MG (OSM) Oral Tablet 24 Hr Take 1 tablet (500 mg total) by mouth daily with breakfast.      omeprazole 20 MG Oral Capsule Delayed Release Take 1 capsule (20 mg total) by mouth every morning.      NALTREXONE HCL OR       Topiramate (TOPIRAGEN OR)       Cyanocobalamin (B-12) 1000 MCG Oral Cap Take 1 tablet by mouth daily.      TRUVADA 200-300 MG Oral Tab Take 1 tablet by mouth daily.      predniSONE 10 MG Oral Tab Take 3 tabs(30 mg) with food once a day x 5 days 15 tablet 0       Allergies:  Allergies[1]      ROS:   Allergic/Immuno:  See hpi  Cardiovascular:  Negative for irregular heartbeat/palpitations, chest pain, edema  Constitutional:  Negative night sweats,weight loss, irritability and lethargy  ENMT:  Negative for ear drainage, hearing loss and nasal drainage  Eyes:  Negative for eye discharge and vision loss  Gastrointestinal:  Negative for abdominal pain, diarrhea and vomiting  Integumentary:  Negative for pruritus and rash  Respiratory:  Negative for cough, dyspnea and wheezing    PHYSICAL EXAM:   Constitutional: responsive, no acute distress noted  Head/Face: NC/Atraumatic  Eyes/Vision: conjunctiva and lids are normal extraocular motion is intact   Ears/Audiometry: tympanic membranes are normal bilaterally hearing is grossly intact  Nose/Mouth/Throat: nose and throat are clear mucous membranes are moist   Neck/Thyroid: neck is supple without adenopathy  Lymphatic: no abnormal cervical, supraclavicular or axillary adenopathy is noted  Respiratory: normal to inspection lungs are clear to auscultation bilaterally normal respiratory effort   Cardiovascular: regular rate and rhythm no murmurs, gallups, or rubs  Abdomen: soft non-tender non-distended  Skin/Hair: no unusual rashes present   Extremities: no edema, cyanosis, or clubbing     ASSESSMENT/PLAN:   Assessment   Encounter Diagnoses   Name Primary?    Chronic idiopathic urticaria Yes    Visit for monitoring Xolair therapy     Seasonal and  perennial allergic rhinoconjunctivitis     Flu vaccine need     Need for COVID-19 vaccine        Assessment & Plan  Chronic idiopathic urticaria and angioedema  Well controlled with Xolair 300 mg every four weeks. A flare-up occurred due to a two-week delay in Xolair administration, causing mild swelling and angioedema on the ball of the foot. Symptoms resolved with increased Xyzal and two days of prednisone. No emergency department visits or additional prednisone needed since the flare-up. No side effects reported with Xolair. Discussed potential tapering strategy by extending the interval between Xolair doses to every five weeks, then six weeks, and so on, up to eight weeks, to assess the possibility of discontinuation if hives remain controlled.  - Continue Xolair 300 mg every four weeks.  - Use Xyzal once daily, increasing to four times per day if needed.  - Prescribe 15 tablets of prednisone 10 mg for emergency use in case of future flare-ups.    Allergic rhinitis  Currently asymptomatic. Xolair may provide some benefit due to its mechanism of action, although it is not specifically indicated for allergic rhinitis.  - Continue Xyzal once daily.  - Consider adding Flonase or Nasacort two sprays per nostril once daily if experiencing nasal congestion or postnasal drip.    ADHD  Managed with lisdexamfetamine (Vyvanse) 40 mg once daily. Recent increase from 30 mg to 40 mg.  - Ensure lisdexamfetamine (Vyvanse) prescription reflects 40 mg once daily.    Medication management  Current medications include spironolactone, estradiol, omeprazole, and lisdexamfetamine. EpiPen is available but has not been needed.  - Ensure all current medications are accurately documented and dosed.    Preventive care  Due for flu vaccine and COVID vaccine booster in the fall.  - Administer flu vaccine in the fall.  - Administer COVID vaccine booster in the fall.            Orders This Visit:  No orders of the defined types were placed in  this encounter.      Meds This Visit:  Requested Prescriptions      No prescriptions requested or ordered in this encounter       Imaging & Referrals:  None     4/2/2025  Clive Salgado MD    If medication samples were provided today, they were provided solely for patient education and training related to self administration of these medications.  Teaching, instruction and sample was provided to the patient by myself.  Teaching included  a review of potential adverse side effects as well as potential efficacy.  Patient's questions were answered in regards to medication administration and dosing and potential side effects. Teaching was provided via the teach back method           [1] No Known Allergies

## 2025-04-02 NOTE — PROGRESS NOTES
The following individual(s) verbally consented to be recorded using ambient AI listening technology and understand that they can each withdraw their consent to this listening technology at any point by asking the clinician to turn off or pause the recording:    Patient name: Phoenix Leodinh  Additional names:

## (undated) NOTE — LETTER
May 30, 2023      [address] 89      Dear Matteo Lares:    Our office has been trying to contact you to discuss your recent test results. Please contact our office at your earliest convenience at [phone]. As always, thank you for selecting [facility] for your healthcare needs.     Sincerely,    Your Physician & Nursing Staff